# Patient Record
Sex: MALE | Race: WHITE | NOT HISPANIC OR LATINO | Employment: FULL TIME | URBAN - METROPOLITAN AREA
[De-identification: names, ages, dates, MRNs, and addresses within clinical notes are randomized per-mention and may not be internally consistent; named-entity substitution may affect disease eponyms.]

---

## 2017-01-24 ENCOUNTER — OFFICE VISIT (OUTPATIENT)
Dept: FAMILY MEDICINE CLINIC | Facility: CLINIC | Age: 52
End: 2017-01-24

## 2017-01-24 VITALS
BODY MASS INDEX: 33.33 KG/M2 | WEIGHT: 225 LBS | HEIGHT: 69 IN | DIASTOLIC BLOOD PRESSURE: 80 MMHG | SYSTOLIC BLOOD PRESSURE: 132 MMHG | HEART RATE: 69 BPM | OXYGEN SATURATION: 97 % | RESPIRATION RATE: 17 BRPM

## 2017-01-24 DIAGNOSIS — Z00.00 ANNUAL PHYSICAL EXAM: Primary | ICD-10-CM

## 2017-01-24 DIAGNOSIS — Z80.0 FAMILY HISTORY OF COLON CANCER: ICD-10-CM

## 2017-01-24 DIAGNOSIS — E03.9 ADULT HYPOTHYROIDISM: ICD-10-CM

## 2017-01-24 DIAGNOSIS — Z23 IMMUNIZATION DUE: ICD-10-CM

## 2017-01-24 PROBLEM — E78.5 HLD (HYPERLIPIDEMIA): Status: ACTIVE | Noted: 2017-01-24

## 2017-01-24 PROBLEM — E78.5 DYSLIPIDEMIA: Status: ACTIVE | Noted: 2017-01-24

## 2017-01-24 PROBLEM — E29.1 HYPOGONADISM IN MALE: Status: ACTIVE | Noted: 2017-01-24

## 2017-01-24 PROCEDURE — 99396 PREV VISIT EST AGE 40-64: CPT | Performed by: FAMILY MEDICINE

## 2017-01-24 PROCEDURE — 90714 TD VACC NO PRESV 7 YRS+ IM: CPT | Performed by: FAMILY MEDICINE

## 2017-01-24 PROCEDURE — 90471 IMMUNIZATION ADMIN: CPT | Performed by: FAMILY MEDICINE

## 2017-01-24 RX ORDER — LEVOTHYROXINE SODIUM 0.05 MG/1
TABLET ORAL
Refills: 1 | COMMUNITY
Start: 2016-12-19 | End: 2019-06-18

## 2017-01-24 NOTE — MR AVS SNAPSHOT
Tre Kenney   1/24/2017 9:00 AM   Office Visit    Dept Phone:  [phone]   Encounter #:  [id number]    Provider:  Fransisca Khan MD   Department:  [facility]                Your Full Care Plan              Today's Medication Changes          These changes are accurate as of: 1/24/17  9:55 AM.  If you have any questions, ask your nurse or doctor.               Stop taking medication(s)listed here:     amoxicillin-clavulanate 875-125 MG per tablet   Commonly known as:  AUGMENTIN                      Your Updated Medication List          This list is accurate as of: 1/24/17  9:55 AM.  Always use your most recent med list.                clomiPHENE 50 MG tablet   Commonly known as:  CLOMID       fish oil 1200 MG capsule capsule       levothyroxine 50 MCG tablet   Commonly known as:  SYNTHROID, LEVOTHROID       MENS MULTIVITAMIN PLUS tablet       NATURE-THROID 97.5 MG tablet   Generic drug:  Thyroid       Rhodiola 300 MG capsule       vitamin D3 5000 UNITS capsule capsule       Zinc 30 MG capsule               We Performed the Following     Td Vaccine Greater Than or Equal To 6yo Preservative Free IM       You Were Diagnosed With        Codes Comments    Annual physical exam    -  Primary ICD-10-CM: Z00.00  ICD-9-CM: V70.0     Immunization due     ICD-10-CM: Z23  ICD-9-CM: V05.9     Adult hypothyroidism     ICD-10-CM: E03.9  ICD-9-CM: 244.9     Family history of colon cancer     ICD-10-CM: Z80.0  ICD-9-CM: V16.0       Instructions     None    Patient Instructions History      Upcoming Appointments     Visit Type Date Time Department    PHYSICAL 1/24/2017  9:00 AM DAMIAN EBLLO      [employer] Signup     Our records indicate that you have an active [employer] account.    You can view your After Visit Summary by going to [employer] and logging in with your [employer] username and password.  If you don't have a [employer] username  "and password but a parent or guardian has access to your record, the parent or guardian should login with their own [employer] username and password and access your record to view the After Visit Summary.    If you have questions, you can email [email or web address] or call [phone] to talk to our [employer] staff.  Remember, [employer] is NOT to be used for urgent needs.  For medical emergencies, dial 911.               Other Info from Your Visit           Allergies     Sulfa Antibiotics  Rash      Reason for Visit     Annual Exam           Vital Signs     Blood Pressure Pulse Respirations Height Weight Oxygen Saturation    132/80 69 17 68.75\" (174.6 cm) 225 lb (102 kg) 97%    Body Mass Index                   33.47 kg/m2           Problems and Diagnoses Noted     Adult hypothyroidism    Dyslipidemia    Family history of colon cancer    High cholesterol or triglycerides    Hypogonadism in male    Annual physical exam    -  Primary    Immunization due            "

## 2017-01-24 NOTE — PROGRESS NOTES
"Subjective   Tre Kenney is a 51 y.o. male.     History of Present Illness He is here for a CPE. All labs from here and Dr Rodriguez in [place] are good.   Needs colonoscopy every 5 years bc of father. Done about 5 yr ago. He is not sure where it was done.   Says shoulder pain compared to his last visit of 12/15 is improved.    The following portions of the patient's history were reviewed and updated as appropriate: allergies, current medications, past social history and problem list.    Review of Systems   Constitutional: Negative for appetite change, fever and unexpected weight change.   HENT: Negative for ear pain, facial swelling and sore throat.    Eyes: Negative for pain and visual disturbance.   Respiratory: Negative for chest tightness, shortness of breath and wheezing.    Cardiovascular: Negative for chest pain and palpitations.   Gastrointestinal: Negative for abdominal pain and blood in stool.   Endocrine: Negative.    Genitourinary: Negative for difficulty urinating and hematuria.   Musculoskeletal: Negative for joint swelling.   Neurological: Negative for tremors, seizures and syncope.   Hematological: Negative for adenopathy.   Psychiatric/Behavioral: Negative.        Objective   Visit Vitals   • /80   • Pulse 69   • Resp 17   • Ht 68.75\" (174.6 cm)   • Wt 225 lb (102 kg)   • SpO2 97%   • BMI 33.47 kg/m2     Physical Exam   Constitutional: He is oriented to person, place, and time. He appears well-developed and well-nourished. No distress.   HENT:   Head: Normocephalic and atraumatic. Hair is normal.   Right Ear: Hearing, tympanic membrane, external ear and ear canal normal.   Left Ear: Hearing, tympanic membrane, external ear and ear canal normal.   Nose: No sinus tenderness or nasal deformity.   Mouth/Throat: Uvula is midline, oropharynx is clear and moist and mucous membranes are normal. No oral lesions. No uvula swelling.   Eyes: Conjunctivae, EOM and lids are normal. Pupils are equal, " round, and reactive to light. Right eye exhibits no discharge. Left eye exhibits no discharge. No scleral icterus. Right eye exhibits normal extraocular motion and no nystagmus. Left eye exhibits normal extraocular motion and no nystagmus.   Fundoscopic exam:       The right eye shows red reflex.        The left eye shows red reflex.   Neck: Normal range of motion. Neck supple. No JVD present. No tracheal deviation present. No thyromegaly present.   Cardiovascular: Normal rate, regular rhythm, normal heart sounds, intact distal pulses and normal pulses.  Exam reveals no gallop.    No murmur heard.  Pulmonary/Chest: Effort normal and breath sounds normal. No respiratory distress. He has no wheezes. He has no rales. He exhibits no tenderness.   Abdominal: Soft. Bowel sounds are normal. He exhibits no distension and no mass. There is no tenderness. There is no guarding. No hernia.   Musculoskeletal: Normal range of motion. He exhibits no edema, tenderness or deformity.   Lymphadenopathy:     He has no cervical adenopathy.   Neurological: He is alert and oriented to person, place, and time. He has normal reflexes. He displays normal reflexes. No cranial nerve deficit. He exhibits normal muscle tone. Coordination normal.   Skin: Skin is warm and dry. No rash noted. He is not diaphoretic.   Psychiatric: He has a normal mood and affect. His behavior is normal. Judgment and thought content normal.   Nursing note and vitals reviewed.      Assessment/Plan   Problem List Items Addressed This Visit        Endocrine    Adult hypothyroidism    Relevant Medications    levothyroxine (SYNTHROID, LEVOTHROID) 50 MCG tablet       Other    Family history of colon cancer      Other Visit Diagnoses     Annual physical exam    -  Primary    Immunization due        Relevant Orders    Td Vaccine Greater Than or Equal To 6yo Preservative Free IM (Completed)           He will let me know when and where he had his last colonoscopy. I don't know  which free [facility] [place] he is referring to.

## 2017-12-04 VITALS
DIASTOLIC BLOOD PRESSURE: 68 MMHG | SYSTOLIC BLOOD PRESSURE: 124 MMHG | SYSTOLIC BLOOD PRESSURE: 120 MMHG | TEMPERATURE: 98.7 F | HEART RATE: 66 BPM | SYSTOLIC BLOOD PRESSURE: 117 MMHG | OXYGEN SATURATION: 94 % | HEART RATE: 69 BPM | DIASTOLIC BLOOD PRESSURE: 72 MMHG | SYSTOLIC BLOOD PRESSURE: 152 MMHG | DIASTOLIC BLOOD PRESSURE: 69 MMHG | SYSTOLIC BLOOD PRESSURE: 121 MMHG | TEMPERATURE: 97.6 F | SYSTOLIC BLOOD PRESSURE: 133 MMHG | DIASTOLIC BLOOD PRESSURE: 76 MMHG | HEART RATE: 70 BPM | HEART RATE: 64 BPM | HEART RATE: 65 BPM | RESPIRATION RATE: 22 BRPM | RESPIRATION RATE: 10 BRPM | DIASTOLIC BLOOD PRESSURE: 95 MMHG | RESPIRATION RATE: 12 BRPM | DIASTOLIC BLOOD PRESSURE: 85 MMHG | RESPIRATION RATE: 18 BRPM | OXYGEN SATURATION: 97 % | HEART RATE: 62 BPM | DIASTOLIC BLOOD PRESSURE: 71 MMHG | RESPIRATION RATE: 16 BRPM | RESPIRATION RATE: 14 BRPM | OXYGEN SATURATION: 91 % | SYSTOLIC BLOOD PRESSURE: 131 MMHG | DIASTOLIC BLOOD PRESSURE: 83 MMHG | DIASTOLIC BLOOD PRESSURE: 51 MMHG | OXYGEN SATURATION: 98 % | HEART RATE: 60 BPM | OXYGEN SATURATION: 95 % | HEIGHT: 69 IN | HEART RATE: 59 BPM | SYSTOLIC BLOOD PRESSURE: 118 MMHG | SYSTOLIC BLOOD PRESSURE: 143 MMHG | DIASTOLIC BLOOD PRESSURE: 60 MMHG | SYSTOLIC BLOOD PRESSURE: 95 MMHG | OXYGEN SATURATION: 96 % | RESPIRATION RATE: 15 BRPM | WEIGHT: 217 LBS | HEART RATE: 61 BPM | SYSTOLIC BLOOD PRESSURE: 154 MMHG

## 2017-12-05 ENCOUNTER — AMBULATORY SURGICAL CENTER (AMBULATORY)
Dept: URBAN - METROPOLITAN AREA SURGERY 17 | Facility: SURGERY | Age: 52
End: 2017-12-05
Payer: COMMERCIAL

## 2017-12-05 ENCOUNTER — OFFICE (AMBULATORY)
Dept: URBAN - METROPOLITAN AREA CLINIC 64 | Facility: CLINIC | Age: 52
End: 2017-12-05
Payer: COMMERCIAL

## 2017-12-05 DIAGNOSIS — K64.8 OTHER HEMORRHOIDS: ICD-10-CM

## 2017-12-05 DIAGNOSIS — Z80.0 FAMILY HISTORY OF MALIGNANT NEOPLASM OF DIGESTIVE ORGANS: ICD-10-CM

## 2017-12-05 DIAGNOSIS — Z86.010 PERSONAL HISTORY OF COLONIC POLYPS: ICD-10-CM

## 2017-12-05 DIAGNOSIS — D12.4 BENIGN NEOPLASM OF DESCENDING COLON: ICD-10-CM

## 2017-12-05 PROBLEM — Z12.11 SURVEILLANCE DUE TO PRIOR COLONIC NEOPLASIA: Status: ACTIVE | Noted: 2017-12-05

## 2017-12-05 LAB
GI HISTOLOGY: A. SELECT: (no result)
GI HISTOLOGY: PDF REPORT: (no result)

## 2017-12-05 PROCEDURE — 45385 COLONOSCOPY W/LESION REMOVAL: CPT | Mod: 33 | Performed by: INTERNAL MEDICINE

## 2017-12-05 PROCEDURE — 88305 TISSUE EXAM BY PATHOLOGIST: CPT | Performed by: INTERNAL MEDICINE

## 2017-12-05 RX ADMIN — PROPOFOL 25 MG: 10 INJECTION, EMULSION INTRAVENOUS at 10:45

## 2017-12-05 RX ADMIN — PROPOFOL 50 MG: 10 INJECTION, EMULSION INTRAVENOUS at 10:43

## 2017-12-05 RX ADMIN — PROPOFOL 25 MG: 10 INJECTION, EMULSION INTRAVENOUS at 10:56

## 2017-12-05 RX ADMIN — PROPOFOL 25 MG: 10 INJECTION, EMULSION INTRAVENOUS at 10:50

## 2017-12-05 RX ADMIN — PROPOFOL 25 MG: 10 INJECTION, EMULSION INTRAVENOUS at 10:53

## 2017-12-05 RX ADMIN — PROPOFOL 100 MG: 10 INJECTION, EMULSION INTRAVENOUS at 10:33

## 2017-12-05 RX ADMIN — PROPOFOL 50 MG: 10 INJECTION, EMULSION INTRAVENOUS at 10:37

## 2017-12-05 RX ADMIN — PROPOFOL 25 MG: 10 INJECTION, EMULSION INTRAVENOUS at 10:47

## 2017-12-05 RX ADMIN — PROPOFOL 50 MG: 10 INJECTION, EMULSION INTRAVENOUS at 10:41

## 2017-12-05 RX ADMIN — LIDOCAINE HYDROCHLORIDE 50 MG: 10 INJECTION, SOLUTION EPIDURAL; INFILTRATION; INTRACAUDAL; PERINEURAL at 10:33

## 2017-12-05 RX ADMIN — PROPOFOL 50 MG: 10 INJECTION, EMULSION INTRAVENOUS at 10:35

## 2017-12-05 RX ADMIN — PROPOFOL 50 MG: 10 INJECTION, EMULSION INTRAVENOUS at 10:39

## 2019-06-18 ENCOUNTER — OFFICE VISIT (OUTPATIENT)
Dept: INTERNAL MEDICINE | Facility: CLINIC | Age: 54
End: 2019-06-18

## 2019-06-18 VITALS
HEIGHT: 69 IN | HEART RATE: 66 BPM | SYSTOLIC BLOOD PRESSURE: 134 MMHG | OXYGEN SATURATION: 97 % | WEIGHT: 217 LBS | DIASTOLIC BLOOD PRESSURE: 82 MMHG | BODY MASS INDEX: 32.14 KG/M2

## 2019-06-18 DIAGNOSIS — Z00.00 PHYSICAL EXAM: Primary | ICD-10-CM

## 2019-06-18 DIAGNOSIS — Z12.11 SCREENING FOR COLON CANCER: ICD-10-CM

## 2019-06-18 DIAGNOSIS — Z12.5 SCREENING FOR PROSTATE CANCER: ICD-10-CM

## 2019-06-18 DIAGNOSIS — E78.5 HYPERLIPIDEMIA, UNSPECIFIED HYPERLIPIDEMIA TYPE: ICD-10-CM

## 2019-06-18 DIAGNOSIS — E03.9 ADULT HYPOTHYROIDISM: ICD-10-CM

## 2019-06-18 DIAGNOSIS — L71.9 ROSACEA: ICD-10-CM

## 2019-06-18 DIAGNOSIS — R79.89 LOW TESTOSTERONE LEVEL IN MALE: ICD-10-CM

## 2019-06-18 PROBLEM — E29.1 HYPOGONADISM IN MALE: Status: RESOLVED | Noted: 2017-01-24 | Resolved: 2019-06-18

## 2019-06-18 LAB — HEMOCCULT STL QL IA: NEGATIVE

## 2019-06-18 PROCEDURE — 93000 ELECTROCARDIOGRAM COMPLETE: CPT | Performed by: NURSE PRACTITIONER

## 2019-06-18 PROCEDURE — 82274 ASSAY TEST FOR BLOOD FECAL: CPT | Performed by: NURSE PRACTITIONER

## 2019-06-18 PROCEDURE — 99396 PREV VISIT EST AGE 40-64: CPT | Performed by: NURSE PRACTITIONER

## 2019-06-18 RX ORDER — LEVOTHYROXINE SODIUM 0.05 MG/1
TABLET ORAL DAILY
COMMUNITY
End: 2021-03-23

## 2019-06-18 RX ORDER — AMOXICILLIN 500 MG
CAPSULE ORAL
COMMUNITY

## 2019-06-18 RX ORDER — LANOLIN ALCOHOL/MO/W.PET/CERES
1 CREAM (GRAM) TOPICAL
COMMUNITY
End: 2021-03-23

## 2019-06-18 RX ORDER — PYRIDOXINE HCL (VITAMIN B6) 100 MG
TABLET ORAL DAILY
COMMUNITY

## 2019-06-18 RX ORDER — LIOTHYRONINE SODIUM 100 %
POWDER (GRAM) MISCELLANEOUS
COMMUNITY
End: 2021-03-23

## 2019-06-18 RX ORDER — METRONIDAZOLE 10 MG/G
1 GEL TOPICAL DAILY
COMMUNITY
End: 2022-05-25

## 2019-06-18 NOTE — PROGRESS NOTES
Subjective   Tre Kenney is a 54 y.o. male who presents to establish care and for a physical exam.    He is a previous patient of Dr. Maxwell, presents today for a physical exam.         The following portions of the patient's history were reviewed and updated as appropriate: allergies, current medications, past social history and problem list.    No past medical history on file.      Current Outpatient Medications:   •  Bioflavonoid Products (VITAMIN C ER) 1000-100 MG tablet controlled-release, Daily., Disp: , Rfl:   •  Cholecalciferol (VITAMIN D3) 5000 UNITS capsule capsule, Take 5,000 Units by mouth daily., Disp: , Rfl:   •  levothyroxine (LEVOXYL) 50 MCG tablet, Daily., Disp: , Rfl:   •  Liothyronine powder, Compounded to make extended release take 86 mcg in the morning, Disp: , Rfl:   •  melatonin 3 MG tablet, Take 1 tablet by mouth every night at bedtime., Disp: , Rfl:   •  metroNIDAZOLE (METROGEL) 1 % gel, Apply 1 application topically to the appropriate area as directed Daily. For rosacea, Disp: , Rfl:   •  Omega-3 Fatty Acids (FISH OIL) 1200 MG capsule capsule, , Disp: , Rfl:   •  Unable to find, Daily. Eder Al's Formula (multivitamin), Disp: , Rfl:   •  Unable to find, Daily. Eder Methyl Guard Plus, 2 in the morning, 2 in the evening, Disp: , Rfl:   •  Unable to find, Eder Choleast 600 mg red yeast rice+CoQ 10-3 in a.m., 3 in p.m., Disp: , Rfl:   •  Unable to find, Kyolic Reserve-aged garlic extract, 2 in am, 2 in pm, Disp: , Rfl:   •  Unable to find, Daily. Kyodophilus 9, Disp: , Rfl:     Allergies   Allergen Reactions   • Sulfa Antibiotics Rash       Review of Systems   Constitutional: Negative for activity change, appetite change, chills, diaphoresis, fatigue, fever and unexpected weight change.   HENT: Negative for congestion, dental problem, drooling, ear discharge, ear pain, facial swelling, hearing loss, mouth sores, nosebleeds, postnasal drip, rhinorrhea, sinus pressure, sore throat,  "tinnitus and trouble swallowing.    Eyes: Positive for visual disturbance (wears glasses, due for eye exam). Negative for photophobia, pain, discharge, redness and itching.   Respiratory: Negative for apnea, cough, choking, chest tightness, shortness of breath and wheezing.    Cardiovascular: Negative for chest pain, palpitations and leg swelling.        No orthopnea, PND, RANKIN   Gastrointestinal: Negative for abdominal pain, blood in stool, constipation, diarrhea, nausea and vomiting.   Endocrine: Negative for cold intolerance, heat intolerance, polydipsia and polyuria.   Genitourinary: Negative for decreased urine volume, dysuria, enuresis, flank pain, frequency, hematuria and urgency.   Musculoskeletal: Negative for arthralgias, back pain, gait problem, joint swelling, myalgias, neck pain and neck stiffness.   Skin: Negative for color change and rash.        No hair changes, no nail changes   Allergic/Immunologic: Negative for environmental allergies, food allergies and immunocompromised state.   Neurological: Positive for headaches (occasionally, worse with fatigue). Negative for dizziness, tremors, seizures, syncope, speech difficulty, weakness, light-headedness and numbness.   Hematological: Negative for adenopathy. Does not bruise/bleed easily.   Psychiatric/Behavioral: Negative for agitation, confusion, decreased concentration, dysphoric mood, sleep disturbance and suicidal ideas. The patient is not nervous/anxious.        Objective   Vitals:    06/18/19 0809   BP: 134/82   BP Location: Left arm   Patient Position: Sitting   Cuff Size: Adult   Pulse: 66   SpO2: 97%   Weight: 98.4 kg (217 lb)   Height: 175.3 cm (69\")     Physical Exam   Constitutional: He is oriented to person, place, and time. He appears well-developed and well-nourished. No distress.   HENT:   Head: Normocephalic and atraumatic.   Right Ear: External ear normal.   Left Ear: External ear normal.   Nose: Nose normal.   Mouth/Throat: Oropharynx " is clear and moist.   Eyes: Conjunctivae and EOM are normal. Pupils are equal, round, and reactive to light. Right eye exhibits no discharge. Left eye exhibits no discharge. No scleral icterus.   Neck: Normal range of motion. Neck supple. No JVD present. No tracheal deviation present. No thyromegaly present.   Cardiovascular: Normal rate, regular rhythm, normal heart sounds and intact distal pulses. Exam reveals no gallop and no friction rub.   No murmur heard.  Pulmonary/Chest: Effort normal and breath sounds normal. No respiratory distress. He has no wheezes. He has no rales. He exhibits no tenderness.   Abdominal: Soft. Bowel sounds are normal. He exhibits no distension and no mass. There is no tenderness. There is no rebound and no guarding. No hernia.   Genitourinary: Rectum normal, prostate normal and penis normal. Rectal exam shows guaiac negative stool.   Musculoskeletal: Normal range of motion. He exhibits no edema.   Lymphadenopathy:     He has no cervical adenopathy.   Neurological: He is alert and oriented to person, place, and time. He has normal reflexes. No cranial nerve deficit. He exhibits normal muscle tone. Coordination normal.   Skin: Skin is warm and dry. No rash noted. No erythema.   Psychiatric: He has a normal mood and affect. His behavior is normal. Judgment and thought content normal.   Vitals reviewed.      Assessment/Plan   Tre was seen today for annual exam.    Diagnoses and all orders for this visit:    Physical exam    Adult hypothyroidism    Hyperlipidemia, unspecified hyperlipidemia type    Low testosterone level in male    Rosacea    Screening for colon cancer  -     POC FECAL OCCULT BLOOD BY IMMUNOASSAY    Other orders  -     ECG 12 Lead      ECG 12 Lead  Date/Time: 6/18/2019 8:54 AM  Performed by: Hortensia Arroyo LPN  Authorized by: Pati Bashir APRN   Comparison: not compared with previous ECG   Previous ECG: no previous ECG available  Rhythm: sinus rhythm  Rate:  normal  BPM: 64  Conduction: conduction normal  ST Segments: ST segments normal  T Waves: T waves normal  QRS axis: normal  Other: no other findings    Clinical impression: normal ECG        Risk assessment:  He has a hx of hypothyroidism and low testosterone level, currently managed by integrative medicine physician in [place], [place]. He has labs done every 6 months (last done 1/2019), scheduled for labs in July. He will send a copy of those labs to me for review.  He also has a family hx (dad) of colon cancer, last colonoscopy 2017 by Dr. Shar Kenney.  He exercises regularly (does spinning class on Mondays), BMI is elevated at 32.1.  He was recently diagnosed with rosacea and started on MetroGel by his dermatologist.    Prevention:  His next colonoscopy is due in 2012.  Discussed Shingrix vaccine, he will check with pharmacy regarding coverage and availability.  His Tdap was administered in 2017, he has declined annual flu vaccines.  He receives regular eye exams.

## 2019-06-19 PROBLEM — L71.9 ROSACEA: Status: ACTIVE | Noted: 2019-06-19

## 2019-06-24 ENCOUNTER — TELEPHONE (OUTPATIENT)
Dept: INTERNAL MEDICINE | Facility: CLINIC | Age: 54
End: 2019-06-24

## 2019-06-24 NOTE — TELEPHONE ENCOUNTER
----- Message from KARON Thomson sent at 6/23/2019  7:58 AM EDT -----  Regarding: Lab orders  Pt would like printed order for labs (from 6/18) mailed to him-he will be having labs done at [facility] in July. Thanks.

## 2019-06-28 ENCOUNTER — RESULTS ENCOUNTER (OUTPATIENT)
Dept: INTERNAL MEDICINE | Facility: CLINIC | Age: 54
End: 2019-06-28

## 2019-06-28 DIAGNOSIS — Z12.5 SCREENING FOR PROSTATE CANCER: ICD-10-CM

## 2019-06-28 DIAGNOSIS — Z00.00 PHYSICAL EXAM: ICD-10-CM

## 2021-03-23 ENCOUNTER — OFFICE VISIT (OUTPATIENT)
Dept: INTERNAL MEDICINE | Facility: CLINIC | Age: 56
End: 2021-03-23

## 2021-03-23 VITALS
HEART RATE: 69 BPM | SYSTOLIC BLOOD PRESSURE: 122 MMHG | BODY MASS INDEX: 35.22 KG/M2 | HEIGHT: 69 IN | DIASTOLIC BLOOD PRESSURE: 80 MMHG | OXYGEN SATURATION: 99 % | WEIGHT: 237.8 LBS | RESPIRATION RATE: 16 BRPM | TEMPERATURE: 97.7 F

## 2021-03-23 DIAGNOSIS — Z00.00 PHYSICAL EXAM: Primary | ICD-10-CM

## 2021-03-23 DIAGNOSIS — L71.9 ROSACEA: ICD-10-CM

## 2021-03-23 DIAGNOSIS — Z12.11 SCREENING FOR COLON CANCER: ICD-10-CM

## 2021-03-23 DIAGNOSIS — E03.9 ADULT HYPOTHYROIDISM: Chronic | ICD-10-CM

## 2021-03-23 DIAGNOSIS — E78.00 HYPERCHOLESTEREMIA: Chronic | ICD-10-CM

## 2021-03-23 DIAGNOSIS — Z11.59 SCREENING FOR VIRAL DISEASE: ICD-10-CM

## 2021-03-23 DIAGNOSIS — R79.89 LOW TESTOSTERONE LEVEL IN MALE: Chronic | ICD-10-CM

## 2021-03-23 LAB — HEMOCCULT STL QL IA: NEGATIVE

## 2021-03-23 PROCEDURE — 99396 PREV VISIT EST AGE 40-64: CPT | Performed by: NURSE PRACTITIONER

## 2021-03-23 PROCEDURE — 82274 ASSAY TEST FOR BLOOD FECAL: CPT | Performed by: NURSE PRACTITIONER

## 2021-03-23 RX ORDER — MULTIPLE VITAMINS W/ MINERALS TAB 9MG-400MCG
TAB ORAL
COMMUNITY

## 2021-03-23 RX ORDER — UBIDECARENONE 50 MG
CAPSULE ORAL
Qty: 1 EACH | Refills: 5
Start: 2021-03-23

## 2021-03-23 RX ORDER — LEVOTHYROXINE SODIUM 0.03 MG/1
25 TABLET ORAL DAILY
COMMUNITY

## 2021-03-23 NOTE — PROGRESS NOTES
Subjective   Tre Kenney is a 56 y.o. male who presents for a physical exam.    History of Present Illness     The following portions of the patient's history were reviewed and updated as appropriate: allergies, current medications, past social history and problem list.    History reviewed. No pertinent past medical history.      Current Outpatient Medications:   •  Barberry-Oreg Grape-Goldenseal (Berberine Complex) 200-200-50 MG capsule, , Disp: , Rfl:   •  Bioflavonoid Products (VITAMIN C ER) 1000-100 MG tablet controlled-release, Daily., Disp: , Rfl:   •  Cholecalciferol (VITAMIN D3) 5000 UNITS capsule capsule, Take 5,000 Units by mouth daily., Disp: , Rfl:   •  clomiPHENE (CLOMID) 50 MG tablet, TAKE 1 TABLET BY MOUTH 3 TIMES PER WEEK, Disp: , Rfl:   •  levothyroxine (SYNTHROID, LEVOTHROID) 25 MCG tablet, Take 25 mcg by mouth Daily. Once a Day, Disp: , Rfl:   •  metroNIDAZOLE (METROGEL) 1 % gel, Apply 1 application topically to the appropriate area as directed Daily. For rosacea, Disp: , Rfl:   •  multivitamin with minerals (Multivitamin Adults 50+) tablet tablet, , Disp: , Rfl:   •  Omega-3 Fatty Acids (FISH OIL) 1200 MG capsule capsule, , Disp: , Rfl:   •  Unable to find, Daily. Eder Methyl Guard Plus, 2 in the morning, 2 in the evening, Disp: , Rfl:   •  Unable to find, Eder Choleast 600 mg red yeast rice+CoQ 10-3 in a.m., 3 in p.m., Disp: , Rfl:   •  Unable to find, Kyolic Reserve-aged garlic extract, 2 in am, 2 in pm, Disp: , Rfl:   •  Unable to find, Daily. Kyodophilus 9, Disp: , Rfl:   •  Red Yeast Rice 600 MG tablet, 2 tablets twice a day (900mg), Disp: 1 each, Rfl: 5    Allergies   Allergen Reactions   • Sulfa Antibiotics Rash       Review of Systems   Constitutional: Negative for activity change, appetite change, chills, diaphoresis, fatigue, fever and unexpected weight change.        Reports 10# weight gain over past several months, has not done cycling class due to pandemic   HENT: Positive for  tinnitus (chronic, no changes). Negative for congestion, dental problem, drooling, ear discharge, ear pain, facial swelling, hearing loss, mouth sores, nosebleeds, postnasal drip, rhinorrhea, sinus pressure, sore throat and trouble swallowing.    Eyes: Positive for visual disturbance (wears glasses, no vision changes (eye exam Fall 2020)). Negative for photophobia, pain, discharge, redness and itching.   Respiratory: Negative for apnea, cough, choking, chest tightness, shortness of breath and wheezing.    Cardiovascular: Negative for chest pain, palpitations and leg swelling.        No orthopnea, PND, RANKIN   Gastrointestinal: Negative for abdominal pain, blood in stool, constipation, diarrhea, nausea and vomiting.   Endocrine: Positive for cold intolerance (improving). Negative for heat intolerance, polydipsia and polyuria.        Low Testosterone   Genitourinary: Negative for decreased urine volume, dysuria, enuresis, flank pain, frequency, hematuria and urgency.   Musculoskeletal: Positive for arthralgias (intermittent joint stiffness). Negative for back pain, gait problem, joint swelling, myalgias, neck pain and neck stiffness.   Skin: Negative for color change and rash.        Hx rosacea, managed by derm (MetroGel)  No hair changes, no nail changes     Allergic/Immunologic: Negative for environmental allergies, food allergies and immunocompromised state.   Neurological: Positive for headaches (triggered by dust (replacing floors in house)). Negative for dizziness, tremors, seizures, syncope, speech difficulty, weakness, light-headedness and numbness.   Hematological: Negative for adenopathy. Does not bruise/bleed easily.   Psychiatric/Behavioral: Negative for agitation, confusion, decreased concentration, dysphoric mood, sleep disturbance and suicidal ideas. The patient is not nervous/anxious.        Objective   Vitals:    03/23/21 1255   BP: 122/80   BP Location: Left arm   Patient Position: Sitting   Cuff Size:  "Adult   Pulse: 69   Resp: 16   Temp: 97.7 °F (36.5 °C)   TempSrc: Temporal   SpO2: 99%   Weight: 108 kg (237 lb 12.8 oz)   Height: 175.3 cm (69\")     Body mass index is 35.12 kg/m².  Physical Exam  Vitals reviewed.   Constitutional:       General: He is not in acute distress.     Appearance: He is well-developed.   HENT:      Head: Normocephalic and atraumatic.      Right Ear: External ear normal.      Left Ear: External ear normal.      Nose: Nose normal.   Eyes:      General: No scleral icterus.        Right eye: No discharge.         Left eye: No discharge.      Conjunctiva/sclera: Conjunctivae normal.      Pupils: Pupils are equal, round, and reactive to light.   Neck:      Thyroid: No thyromegaly.      Vascular: No JVD.      Trachea: No tracheal deviation.   Cardiovascular:      Rate and Rhythm: Normal rate and regular rhythm.      Heart sounds: Normal heart sounds. No murmur heard.   No friction rub. No gallop.    Pulmonary:      Effort: Pulmonary effort is normal. No respiratory distress.      Breath sounds: Normal breath sounds. No wheezing or rales.   Chest:      Chest wall: No tenderness.   Abdominal:      General: Bowel sounds are normal. There is no distension.      Palpations: Abdomen is soft. There is no mass.      Tenderness: There is no abdominal tenderness. There is no guarding or rebound.      Hernia: No hernia is present.   Genitourinary:     Penis: Normal.       Prostate: Normal.      Rectum: Normal. Guaiac result negative.   Musculoskeletal:         General: Normal range of motion.      Cervical back: Normal range of motion and neck supple.   Lymphadenopathy:      Cervical: No cervical adenopathy.   Skin:     General: Skin is warm and dry.      Findings: No erythema or rash.   Neurological:      Mental Status: He is alert and oriented to person, place, and time.      Cranial Nerves: No cranial nerve deficit.      Motor: No abnormal muscle tone.      Coordination: Coordination normal.      Deep " Tendon Reflexes: Reflexes are normal and symmetric.   Psychiatric:         Behavior: Behavior normal.         Thought Content: Thought content normal.         Judgment: Judgment normal.         Assessment/Plan   Diagnoses and all orders for this visit:    1. Physical exam (Primary)  -     CBC & Differential  -     Lipid Panel    2. Hypercholesteremia  Assessment & Plan:  He takes OTC Red Yeast Rice for mgmt    Orders:  -     Red Yeast Rice 600 MG tablet; 2 tablets twice a day (900mg)  Dispense: 1 each; Refill: 5    3. Low testosterone level in male  Assessment & Plan:  Managed by Integrative medicine physician in [place], [place]  Dr. Rodriguez      4. Adult hypothyroidism  Assessment & Plan:  Managed by Integrative medicine physician in [place], [place]  Managed on Synthroid, TSH checked regularly      5. Rosacea  Assessment & Plan:  Stable, not currently managed on any medication      6. Screening for colon cancer  -     POC FECAL OCCULT BLOOD BY IMMUNOASSAY    7. Screening for viral disease  -     Hepatitis C Antibody  Risk assessment:  He has a family hx (father) of lung cancer and colon cancer, he receives a screening colonoscopy q5 years.  He is followed by Dr. Rodriguez in [place], [place] for low testosterone and is currently managed on Clomid. He has brought recent labs for review.  His BMI is 35.12-discussed diet and exercise program with the goal of weight loss.    Prevention:  He has declined an annual flu vaccine. Tdap is current. He has started the COVID-19 series.  Discussed Shingrix vaccine, he will check with pharmacy regarding coverage and availability.  Labs are ordered for his next trip to [facility].    Form completed for [employer].    Discussed healthy lifestyle choices such as maintaining a balanced diet low in carbohydrates and limiting caffeine and alcohol intake.  Recommended routine exercise for bone strength and cardiovascular health.

## 2021-03-30 PROBLEM — R79.89 LOW TESTOSTERONE LEVEL IN MALE: Chronic | Status: ACTIVE | Noted: 2019-06-18

## 2021-03-30 PROBLEM — L71.9 ROSACEA: Chronic | Status: ACTIVE | Noted: 2019-06-19

## 2021-03-30 PROBLEM — E78.00 HYPERCHOLESTEREMIA: Chronic | Status: ACTIVE | Noted: 2017-01-24

## 2021-03-30 PROBLEM — E78.00 HYPERCHOLESTEREMIA: Status: ACTIVE | Noted: 2017-01-24

## 2021-03-30 PROBLEM — E03.9 ADULT HYPOTHYROIDISM: Chronic | Status: ACTIVE | Noted: 2017-01-24

## 2021-03-30 NOTE — ASSESSMENT & PLAN NOTE
Managed by Integrative medicine physician in [place], [place]  Managed on Synthroid, TSH checked regularly

## 2021-04-05 LAB
BASOPHILS # BLD AUTO: 0 X10E3/UL (ref 0–0.2)
BASOPHILS NFR BLD AUTO: 1 %
CHOLEST SERPL-MCNC: 139 MG/DL (ref 100–199)
EOSINOPHIL # BLD AUTO: 0.1 X10E3/UL (ref 0–0.4)
EOSINOPHIL NFR BLD AUTO: 2 %
ERYTHROCYTE [DISTWIDTH] IN BLOOD BY AUTOMATED COUNT: 13.1 % (ref 11.6–15.4)
HCT VFR BLD AUTO: 47.3 % (ref 37.5–51)
HCV AB S/CO SERPL IA: <0.1 S/CO RATIO (ref 0–0.9)
HDLC SERPL-MCNC: 34 MG/DL
HGB BLD-MCNC: 15.7 G/DL (ref 13–17.7)
IMM GRANULOCYTES # BLD AUTO: 0 X10E3/UL (ref 0–0.1)
IMM GRANULOCYTES NFR BLD AUTO: 0 %
LDLC SERPL CALC-MCNC: 86 MG/DL (ref 0–99)
LYMPHOCYTES # BLD AUTO: 2.3 X10E3/UL (ref 0.7–3.1)
LYMPHOCYTES NFR BLD AUTO: 39 %
MCH RBC QN AUTO: 28.1 PG (ref 26.6–33)
MCHC RBC AUTO-ENTMCNC: 33.2 G/DL (ref 31.5–35.7)
MCV RBC AUTO: 85 FL (ref 79–97)
MONOCYTES # BLD AUTO: 0.6 X10E3/UL (ref 0.1–0.9)
MONOCYTES NFR BLD AUTO: 10 %
NEUTROPHILS # BLD AUTO: 2.9 X10E3/UL (ref 1.4–7)
NEUTROPHILS NFR BLD AUTO: 48 %
PLATELET # BLD AUTO: 196 X10E3/UL (ref 150–450)
RBC # BLD AUTO: 5.58 X10E6/UL (ref 4.14–5.8)
TRIGL SERPL-MCNC: 100 MG/DL (ref 0–149)
VLDLC SERPL CALC-MCNC: 19 MG/DL (ref 5–40)
WBC # BLD AUTO: 5.9 X10E3/UL (ref 3.4–10.8)

## 2022-05-25 ENCOUNTER — OFFICE VISIT (OUTPATIENT)
Dept: INTERNAL MEDICINE | Facility: CLINIC | Age: 57
End: 2022-05-25

## 2022-05-25 VITALS
SYSTOLIC BLOOD PRESSURE: 130 MMHG | DIASTOLIC BLOOD PRESSURE: 90 MMHG | HEIGHT: 69 IN | TEMPERATURE: 97.7 F | OXYGEN SATURATION: 95 % | WEIGHT: 233.6 LBS | BODY MASS INDEX: 34.6 KG/M2 | HEART RATE: 65 BPM

## 2022-05-25 DIAGNOSIS — Z00.00 PHYSICAL EXAM: Primary | ICD-10-CM

## 2022-05-25 DIAGNOSIS — R79.89 LOW TESTOSTERONE LEVEL IN MALE: Chronic | ICD-10-CM

## 2022-05-25 DIAGNOSIS — E78.00 HYPERCHOLESTEREMIA: Chronic | ICD-10-CM

## 2022-05-25 DIAGNOSIS — E03.9 ADULT HYPOTHYROIDISM: Chronic | ICD-10-CM

## 2022-05-25 DIAGNOSIS — Z12.5 SCREENING FOR PROSTATE CANCER: ICD-10-CM

## 2022-05-25 DIAGNOSIS — R03.0 ELEVATED BLOOD PRESSURE READING: ICD-10-CM

## 2022-05-25 DIAGNOSIS — Z12.11 SCREENING FOR COLON CANCER: ICD-10-CM

## 2022-05-25 LAB — HEMOCCULT STL QL IA: NEGATIVE

## 2022-05-25 PROCEDURE — 99396 PREV VISIT EST AGE 40-64: CPT | Performed by: NURSE PRACTITIONER

## 2022-05-25 PROCEDURE — 82274 ASSAY TEST FOR BLOOD FECAL: CPT | Performed by: NURSE PRACTITIONER

## 2022-05-25 RX ORDER — GRAPE SEED EXT/BIOFLAV,CITRUS 50MG-250MG
CAPSULE ORAL
COMMUNITY

## 2022-05-25 RX ORDER — IVERMECTIN 10 MG/G
CREAM TOPICAL
COMMUNITY
Start: 2021-08-27

## 2022-05-25 RX ORDER — LIOTHYRONINE SODIUM 100 %
POWDER (GRAM) MISCELLANEOUS
COMMUNITY

## 2022-05-25 NOTE — PROGRESS NOTES
Subjective   Tre Kenney is a 57 y.o. male who is here for a physical exam.    Reports feeling well, receives treatment from Dr. Rodriguez in [place], [place] ([facility]). He is managed on Clomid for ED which he tolerates well.  He has brought recent labs which show a mildly elevated LDL of 1376 with an elevated small LDL.  He is currently managed on red yeast rice which he tolerates well.  He also tries to follow a low-fat, low-cholesterol diet.       The following portions of the patient's history were reviewed and updated as appropriate: allergies, current medications, past social history and problem list.    Past Medical History:   Diagnosis Date   • Allergic 1973    Sulfa drugs   • Colon polyp Dec. 2017    May have been some in previous colonoscopies   • Headache     Occasional   • History of medical problems 2008    Obstructive Sleep Apnea   • HL (hearing loss)     Tinnitus   • Hyperlipidemia    • Hypothyroidism    • Obesity most of life         Current Outpatient Medications:   •  Alpha-Lipoic Acid 100 MG capsule, Take 50 mg by mouth., Disp: , Rfl:   •  Barberry-Oreg Grape-Goldenseal (Berberine Complex) 200-200-50 MG capsule, , Disp: , Rfl:   •  Bioflavonoid Products (VITAMIN C ER) 1000-100 MG tablet controlled-release, Daily., Disp: , Rfl:   •  Cholecalciferol (VITAMIN D3) 5000 UNITS capsule capsule, Take 5,000 Units by mouth daily., Disp: , Rfl:   •  clomiPHENE (CLOMID) 50 MG tablet, TAKE 1 TABLET BY MOUTH 3 TIMES PER WEEK, Disp: , Rfl:   •  Grape Seed Extract 50 MG capsule, Take  by mouth., Disp: , Rfl:   •  INOSITOL-FOLIC ACID PO, Take  by mouth., Disp: , Rfl:   •  Ivermectin 1 % cream, , Disp: , Rfl:   •  levothyroxine (SYNTHROID, LEVOTHROID) 25 MCG tablet, Take 25 mcg by mouth Daily. Once a Day, Disp: , Rfl:   •  Liothyronine powder, , Disp: , Rfl:   •  Magnesium 100 MG capsule, Take  by mouth. 180mg, Disp: , Rfl:   •  multivitamin with minerals tablet tablet, , Disp: , Rfl:   •   Omega-3 Fatty Acids (FISH OIL) 1200 MG capsule capsule, , Disp: , Rfl:   •  Red Yeast Rice 600 MG tablet, 2 tablets twice a day (900mg), Disp: 1 each, Rfl: 5  •  Unable to find, Daily. Eder Methyl Guard Plus, 2 in the morning, 2 in the evening, Disp: , Rfl:   •  Unable to find, Eder Choleast 600 mg red yeast rice+CoQ 10-3 in a.m., 3 in p.m., Disp: , Rfl:   •  Unable to find, Kyolic Reserve-aged garlic extract, 2 in am, 2 in pm, Disp: , Rfl:   •  Unable to find, Jaden Labs Brain Calm, Disp: , Rfl:     Allergies   Allergen Reactions   • Sulfa Antibiotics Rash       Review of Systems   Constitutional: Negative for activity change, appetite change, chills, diaphoresis, fatigue, fever and unexpected weight change.   HENT: Positive for congestion, ear pain (left ear stopped up) and postnasal drip. Negative for dental problem, drooling, ear discharge, facial swelling, hearing loss, mouth sores, nosebleeds, rhinorrhea, sinus pressure, sore throat, tinnitus and trouble swallowing.    Eyes: Negative for photophobia, pain, discharge, redness, itching and visual disturbance.   Respiratory: Negative for apnea, cough, choking, chest tightness, shortness of breath and wheezing.    Cardiovascular: Negative for chest pain, palpitations and leg swelling.        No orthopnea, PND, RANKIN   Gastrointestinal: Negative for abdominal pain, blood in stool, constipation, diarrhea, nausea and vomiting.   Endocrine: Negative for cold intolerance, heat intolerance, polydipsia and polyuria.   Genitourinary: Negative for decreased urine volume, dysuria, enuresis, flank pain, frequency, hematuria and urgency.   Musculoskeletal: Negative for arthralgias, back pain, gait problem, joint swelling, myalgias, neck pain and neck stiffness.   Skin: Negative for color change and rash.        No hair changes, no nail changes   Allergic/Immunologic: Negative for environmental allergies, food allergies and immunocompromised state.   Neurological: Negative  "for dizziness, tremors, seizures, syncope, speech difficulty, weakness, light-headedness, numbness and headaches.   Hematological: Negative for adenopathy. Does not bruise/bleed easily.   Psychiatric/Behavioral: Negative for agitation, confusion, decreased concentration, dysphoric mood, sleep disturbance and suicidal ideas. The patient is not nervous/anxious.        Objective   Vitals:    05/25/22 0800   BP: 130/90   BP Location: Left arm   Patient Position: Sitting   Cuff Size: Adult   Pulse: 65   Temp: 97.7 °F (36.5 °C)   TempSrc: Temporal   SpO2: 95%   Weight: 106 kg (233 lb 9.6 oz)   Height: 175.3 cm (69\")     Physical Exam  Vitals reviewed.   Constitutional:       General: He is not in acute distress.     Appearance: He is well-developed.   HENT:      Head: Normocephalic and atraumatic.      Right Ear: External ear normal.      Left Ear: External ear normal.      Nose: Nose normal.   Eyes:      General: No scleral icterus.        Right eye: No discharge.         Left eye: No discharge.      Conjunctiva/sclera: Conjunctivae normal.      Pupils: Pupils are equal, round, and reactive to light.   Neck:      Thyroid: No thyromegaly.      Vascular: No JVD.      Trachea: No tracheal deviation.   Cardiovascular:      Rate and Rhythm: Normal rate and regular rhythm.      Heart sounds: Normal heart sounds. No murmur heard.    No friction rub. No gallop.   Pulmonary:      Effort: Pulmonary effort is normal. No respiratory distress.      Breath sounds: Normal breath sounds. No wheezing or rales.   Chest:      Chest wall: No tenderness.   Abdominal:      General: Bowel sounds are normal. There is no distension.      Palpations: Abdomen is soft. There is no mass.      Tenderness: There is no abdominal tenderness. There is no guarding or rebound.      Hernia: No hernia is present.   Genitourinary:     Penis: Normal.       Prostate: Normal.      Rectum: Normal. Guaiac result negative.   Musculoskeletal:         General: Normal " range of motion.      Cervical back: Normal range of motion and neck supple.   Lymphadenopathy:      Cervical: No cervical adenopathy.   Skin:     General: Skin is warm and dry.      Findings: No erythema or rash.   Neurological:      Mental Status: He is alert and oriented to person, place, and time.      Cranial Nerves: No cranial nerve deficit.      Motor: No abnormal muscle tone.      Coordination: Coordination normal.      Deep Tendon Reflexes: Reflexes are normal and symmetric.   Psychiatric:         Behavior: Behavior normal.         Thought Content: Thought content normal.         Judgment: Judgment normal.         Assessment & Plan   Diagnoses and all orders for this visit:    1. Physical exam (Primary)  -     CBC (No Diff)  -     Comprehensive Metabolic Panel  -     Urinalysis With Microscopic If Indicated (No Culture) - Urine, Clean Catch    2. Elevated blood pressure reading  Assessment & Plan:  Blood pressure is elevated in the office today, he will monitor readings at home and send those readings in 2 to 3 weeks.  If blood pressure remains consistently >140/90 will consider medication for further lowering.      3. Adult hypothyroidism  Assessment & Plan:  Synthroid is prescribed by physician in [place], recheck TSH today.      4. Low testosterone level in male  Assessment & Plan:  He continues under the care of of Dr. Rodriguez in [place], continue current medications.      5. Hypercholesteremia  Assessment & Plan:  LDL 80 but LDL particle 1376 with labs done in May.  He will continue red yeast rice along with a low-fat, low-cholesterol diet.      6. Screening for colon cancer  -     POC FECAL OCCULT BLOOD BY IMMUNOASSAY    7. Screening for prostate cancer  -     PSA Screen      Risk assessment:  He has a family hx (mother and father) of hypertension with an elevated reading today.  He will monitor blood pressure at home and notify me with those readings in 2 to 3 weeks.  His Body mass index is 34.5  kg/m². - He is going to be more active this summer with participation in [employer]  program.  Continue a low-fat, low-cholesterol diet along with regular exercise.    Prevention:  Health Maintenance   Topic Date Due   • ZOSTER VACCINE (1 of 2) Never done   • COVID-19 Vaccine (4 - Booster for Pfizer series) 02/25/2022   • ANNUAL PHYSICAL  03/24/2022   • COLORECTAL CANCER SCREENING  12/05/2022   • LIPID PANEL  05/13/2023   • TDAP/TD VACCINES (3 - Tdap) 01/24/2027   • HEPATITIS C SCREENING  Completed   • Pneumococcal Vaccine 0-64  Aged Out   • INFLUENZA VACCINE  Discontinued       Discussed healthy lifestyle choices such as maintaining a balanced diet low in carbohydrates and limiting caffeine and alcohol intake.  Recommended routine exercise for bone strength and cardiovascular health.    Form for participation in [employer] program completed today.

## 2022-05-25 NOTE — ASSESSMENT & PLAN NOTE
LDL 80 but LDL particle 1376 with labs done in May.  He will continue red yeast rice along with a low-fat, low-cholesterol diet.

## 2022-05-25 NOTE — ASSESSMENT & PLAN NOTE
Blood pressure is elevated in the office today, he will monitor readings at home and send those readings in 2 to 3 weeks.  If blood pressure remains consistently >140/90 will consider medication for further lowering.

## 2022-05-26 LAB
ALBUMIN SERPL-MCNC: 4.5 G/DL (ref 3.8–4.9)
ALBUMIN/GLOB SERPL: 1.9 {RATIO} (ref 1.2–2.2)
ALP SERPL-CCNC: 42 IU/L (ref 44–121)
ALT SERPL-CCNC: 16 IU/L (ref 0–44)
APPEARANCE UR: CLEAR
AST SERPL-CCNC: 14 IU/L (ref 0–40)
BILIRUB SERPL-MCNC: 0.3 MG/DL (ref 0–1.2)
BILIRUB UR QL STRIP: NEGATIVE
BUN SERPL-MCNC: 19 MG/DL (ref 6–24)
BUN/CREAT SERPL: 20 (ref 9–20)
CALCIUM SERPL-MCNC: 9.5 MG/DL (ref 8.7–10.2)
CHLORIDE SERPL-SCNC: 103 MMOL/L (ref 96–106)
CO2 SERPL-SCNC: 22 MMOL/L (ref 20–29)
COLOR UR: YELLOW
CREAT SERPL-MCNC: 0.96 MG/DL (ref 0.76–1.27)
EGFRCR SERPLBLD CKD-EPI 2021: 93 ML/MIN/1.73
ERYTHROCYTE [DISTWIDTH] IN BLOOD BY AUTOMATED COUNT: 13.2 % (ref 11.6–15.4)
GLOBULIN SER CALC-MCNC: 2.4 G/DL (ref 1.5–4.5)
GLUCOSE SERPL-MCNC: 100 MG/DL (ref 65–99)
GLUCOSE UR QL STRIP: NEGATIVE
HCT VFR BLD AUTO: 46.3 % (ref 37.5–51)
HGB BLD-MCNC: 15.3 G/DL (ref 13–17.7)
HGB UR QL STRIP: NEGATIVE
KETONES UR QL STRIP: NEGATIVE
LEUKOCYTE ESTERASE UR QL STRIP: NEGATIVE
MCH RBC QN AUTO: 28.1 PG (ref 26.6–33)
MCHC RBC AUTO-ENTMCNC: 33 G/DL (ref 31.5–35.7)
MCV RBC AUTO: 85 FL (ref 79–97)
MICRO URNS: NORMAL
NITRITE UR QL STRIP: NEGATIVE
PH UR STRIP: 5.5 [PH] (ref 5–7.5)
PLATELET # BLD AUTO: 217 X10E3/UL (ref 150–450)
POTASSIUM SERPL-SCNC: 5 MMOL/L (ref 3.5–5.2)
PROT SERPL-MCNC: 6.9 G/DL (ref 6–8.5)
PROT UR QL STRIP: NEGATIVE
PSA SERPL-MCNC: 0.7 NG/ML (ref 0–4)
RBC # BLD AUTO: 5.44 X10E6/UL (ref 4.14–5.8)
SODIUM SERPL-SCNC: 141 MMOL/L (ref 134–144)
SP GR UR STRIP: 1.02 (ref 1–1.03)
UROBILINOGEN UR STRIP-MCNC: 0.2 MG/DL (ref 0.2–1)
WBC # BLD AUTO: 5.5 X10E3/UL (ref 3.4–10.8)

## 2023-01-23 VITALS
DIASTOLIC BLOOD PRESSURE: 66 MMHG | SYSTOLIC BLOOD PRESSURE: 109 MMHG | HEART RATE: 65 BPM | HEART RATE: 47 BPM | HEART RATE: 59 BPM | HEART RATE: 56 BPM | RESPIRATION RATE: 15 BRPM | OXYGEN SATURATION: 95 % | DIASTOLIC BLOOD PRESSURE: 53 MMHG | HEIGHT: 69 IN | HEART RATE: 61 BPM | HEART RATE: 62 BPM | DIASTOLIC BLOOD PRESSURE: 60 MMHG | SYSTOLIC BLOOD PRESSURE: 112 MMHG | TEMPERATURE: 97.5 F | OXYGEN SATURATION: 99 % | HEART RATE: 68 BPM | HEART RATE: 55 BPM | DIASTOLIC BLOOD PRESSURE: 46 MMHG | RESPIRATION RATE: 17 BRPM | DIASTOLIC BLOOD PRESSURE: 49 MMHG | DIASTOLIC BLOOD PRESSURE: 76 MMHG | RESPIRATION RATE: 16 BRPM | OXYGEN SATURATION: 96 % | DIASTOLIC BLOOD PRESSURE: 61 MMHG | DIASTOLIC BLOOD PRESSURE: 82 MMHG | OXYGEN SATURATION: 93 % | SYSTOLIC BLOOD PRESSURE: 147 MMHG | SYSTOLIC BLOOD PRESSURE: 122 MMHG | HEART RATE: 57 BPM | RESPIRATION RATE: 13 BRPM | DIASTOLIC BLOOD PRESSURE: 43 MMHG | SYSTOLIC BLOOD PRESSURE: 105 MMHG | SYSTOLIC BLOOD PRESSURE: 113 MMHG | TEMPERATURE: 97.2 F | DIASTOLIC BLOOD PRESSURE: 59 MMHG | SYSTOLIC BLOOD PRESSURE: 145 MMHG | SYSTOLIC BLOOD PRESSURE: 100 MMHG | SYSTOLIC BLOOD PRESSURE: 143 MMHG | HEART RATE: 54 BPM | DIASTOLIC BLOOD PRESSURE: 67 MMHG | RESPIRATION RATE: 18 BRPM | SYSTOLIC BLOOD PRESSURE: 115 MMHG | OXYGEN SATURATION: 98 % | RESPIRATION RATE: 14 BRPM | SYSTOLIC BLOOD PRESSURE: 98 MMHG | OXYGEN SATURATION: 92 % | SYSTOLIC BLOOD PRESSURE: 133 MMHG | OXYGEN SATURATION: 94 % | RESPIRATION RATE: 11 BRPM | WEIGHT: 230 LBS | RESPIRATION RATE: 10 BRPM

## 2023-01-25 ENCOUNTER — AMBULATORY SURGICAL CENTER (AMBULATORY)
Dept: URBAN - METROPOLITAN AREA SURGERY 17 | Facility: SURGERY | Age: 58
End: 2023-01-25
Payer: COMMERCIAL

## 2023-01-25 DIAGNOSIS — K64.8 OTHER HEMORRHOIDS: ICD-10-CM

## 2023-01-25 DIAGNOSIS — Z80.0 FAMILY HISTORY OF MALIGNANT NEOPLASM OF DIGESTIVE ORGANS: ICD-10-CM

## 2023-01-25 DIAGNOSIS — Z86.010 PERSONAL HISTORY OF COLONIC POLYPS: ICD-10-CM

## 2023-01-25 PROCEDURE — 45378 DIAGNOSTIC COLONOSCOPY: CPT | Mod: 33 | Performed by: INTERNAL MEDICINE

## 2023-05-31 ENCOUNTER — OFFICE VISIT (OUTPATIENT)
Dept: INTERNAL MEDICINE | Facility: CLINIC | Age: 58
End: 2023-05-31
Payer: COMMERCIAL

## 2023-05-31 VITALS
HEART RATE: 68 BPM | WEIGHT: 229.4 LBS | HEIGHT: 69 IN | TEMPERATURE: 98.2 F | OXYGEN SATURATION: 96 % | SYSTOLIC BLOOD PRESSURE: 134 MMHG | DIASTOLIC BLOOD PRESSURE: 84 MMHG | BODY MASS INDEX: 33.98 KG/M2

## 2023-05-31 DIAGNOSIS — E03.9 ADULT HYPOTHYROIDISM: Chronic | ICD-10-CM

## 2023-05-31 DIAGNOSIS — E78.00 HYPERCHOLESTEREMIA: Chronic | ICD-10-CM

## 2023-05-31 DIAGNOSIS — Z00.00 PHYSICAL EXAM: Primary | ICD-10-CM

## 2023-05-31 DIAGNOSIS — R79.89 LOW TESTOSTERONE LEVEL IN MALE: Chronic | ICD-10-CM

## 2023-05-31 DIAGNOSIS — Z12.5 SCREENING FOR PROSTATE CANCER: ICD-10-CM

## 2023-05-31 DIAGNOSIS — E74.39 GLUCOSE INTOLERANCE: ICD-10-CM

## 2023-05-31 PROCEDURE — 99396 PREV VISIT EST AGE 40-64: CPT | Performed by: NURSE PRACTITIONER

## 2023-05-31 RX ORDER — METFORMIN HYDROCHLORIDE 500 MG/1
500 TABLET, EXTENDED RELEASE ORAL
Qty: 30 TABLET | Refills: 5
Start: 2023-05-31

## 2023-05-31 RX ORDER — LIOTHYRONINE SODIUM 100 %
POWDER (GRAM) MISCELLANEOUS
COMMUNITY

## 2023-05-31 NOTE — PROGRESS NOTES
Subjective   Tre Kenney is a 58 y.o. male who is here for a physical exam.    History of Present Illness  He is followed by an integrative medicine in [place] where he receives Clomid for low testosterone. He has brought recent labs which show an A1c of 5.8. He has made lifestyle changes due to elevated glucose, cholesterol; has lost ness 10# over the past 2 months by reducing his carb intake (ness 10-15 g carb/day).  He tested positive for COVID-19 in September, denies any residual symptoms.     The following portions of the patient's history were reviewed and updated as appropriate: allergies, current medications, past social history and problem list.    Past Medical History:   Diagnosis Date    Allergic 1973    Sulfa drugs    Colon polyp Dec. 2017    May have been some in previous colonoscopies    Headache     Occasional    History of medical problems 2008    Obstructive Sleep Apnea    HL (hearing loss)     Tinnitus    Hyperlipidemia     Hypothyroidism     Obesity most of life         Current Outpatient Medications:     Alpha-Lipoic Acid 100 MG capsule, Take 50 mg by mouth., Disp: , Rfl:     Bioflavonoid Products (VITAMIN C ER) 1000-100 MG tablet controlled-release, Daily., Disp: , Rfl:     Cholecalciferol (VITAMIN D3) 5000 UNITS capsule capsule, Take 1 capsule by mouth Daily., Disp: , Rfl:     clomiPHENE (CLOMID) 50 MG tablet, TAKE 1 TABLET BY MOUTH 3 TIMES PER WEEK, Disp: , Rfl:     Grape Seed Extract 50 MG capsule, Take  by mouth., Disp: , Rfl:     INOSITOL-FOLIC ACID PO, Take  by mouth., Disp: , Rfl:     Ivermectin 1 % cream, , Disp: , Rfl:     Liothyronine powder, , Disp: , Rfl:     multivitamin with minerals tablet tablet, , Disp: , Rfl:     Omega-3 Fatty Acids (FISH OIL) 1200 MG capsule capsule, , Disp: , Rfl:     Red Yeast Rice 600 MG tablet, 2 tablets twice a day (900mg), Disp: 1 each, Rfl: 5    Thyroid (LEVOTHYROXINE-LIOTHYRONINE PO), , Disp: , Rfl:     Unable to find, Eder Choleast 600 mg red  yeast rice+CoQ 10-3 in a.m., 3 in p.m., Disp: , Rfl:     Unable to find, Kyolic Reserve-aged garlic extract, 2 in am, 2 in pm, Disp: , Rfl:     metFORMIN ER (GLUCOPHAGE-XR) 500 MG 24 hr tablet, Take 1 tablet by mouth Daily With Breakfast., Disp: 30 tablet, Rfl: 5    Allergies   Allergen Reactions    Sulfa Antibiotics Rash       Review of Systems   Constitutional:  Negative for activity change, appetite change, chills, diaphoresis, fatigue, fever and unexpected weight change.   HENT:  Positive for congestion and postnasal drip. Negative for dental problem, drooling, ear discharge, ear pain, facial swelling, hearing loss, mouth sores, nosebleeds, rhinorrhea, sinus pressure, sore throat, tinnitus and trouble swallowing.    Eyes:  Negative for photophobia, pain, discharge, redness, itching and visual disturbance.   Respiratory:  Negative for apnea, cough, choking, chest tightness, shortness of breath and wheezing.    Cardiovascular:  Negative for chest pain, palpitations and leg swelling.        No orthopnea, PND, RANKIN   Gastrointestinal:  Negative for abdominal pain, blood in stool, constipation, diarrhea, nausea and vomiting.   Endocrine: Negative for cold intolerance, heat intolerance, polydipsia and polyuria.   Genitourinary:  Negative for decreased urine volume, dysuria, enuresis, flank pain, frequency, hematuria and urgency.   Musculoskeletal:  Positive for arthralgias. Negative for back pain, gait problem, joint swelling, myalgias, neck pain and neck stiffness.   Skin:  Negative for color change and rash.        No hair changes, no nail changes   Allergic/Immunologic: Negative for environmental allergies, food allergies and immunocompromised state.   Neurological:  Negative for dizziness, tremors, seizures, syncope, speech difficulty, weakness, light-headedness, numbness and headaches.   Hematological:  Negative for adenopathy. Does not bruise/bleed easily.   Psychiatric/Behavioral:  Negative for agitation,  "confusion, decreased concentration, dysphoric mood, sleep disturbance and suicidal ideas. The patient is not nervous/anxious.      Objective   Vitals:    05/31/23 0754 05/31/23 0833   BP: 130/86 134/84   BP Location: Left arm Right arm   Patient Position: Sitting Sitting   Cuff Size: Adult Adult   Pulse: 68    Temp: 98.2 °F (36.8 °C)    TempSrc: Temporal    SpO2: 96%    Weight: 104 kg (229 lb 6.4 oz)    Height: 175.3 cm (69\")      Physical Exam  Constitutional:       General: He is not in acute distress.     Appearance: Normal appearance. He is not diaphoretic.   HENT:      Head: Normocephalic and atraumatic.      Right Ear: Tympanic membrane, ear canal and external ear normal.      Left Ear: Tympanic membrane, ear canal and external ear normal.      Nose: Nose normal. No rhinorrhea.      Mouth/Throat:      Mouth: Mucous membranes are moist.      Pharynx: Oropharynx is clear.   Eyes:      General:         Right eye: No discharge.         Left eye: No discharge.      Conjunctiva/sclera: Conjunctivae normal.   Cardiovascular:      Rate and Rhythm: Normal rate and regular rhythm.      Pulses: Normal pulses.      Heart sounds: Normal heart sounds.   Pulmonary:      Effort: Pulmonary effort is normal.      Breath sounds: Normal breath sounds.   Abdominal:      General: Bowel sounds are normal.      Tenderness: There is no abdominal tenderness.   Musculoskeletal:         General: No swelling or tenderness.      Cervical back: Normal range of motion.   Skin:     General: Skin is warm and dry.   Neurological:      General: No focal deficit present.      Mental Status: He is alert and oriented to person, place, and time.   Psychiatric:         Mood and Affect: Mood normal.         Behavior: Behavior normal.         Judgment: Judgment normal.       Assessment & Plan   Diagnoses and all orders for this visit:    1. Physical exam (Primary)  -     CBC & Differential    2. Glucose intolerance  Assessment & Plan:  He was started " on Metformin 3/2023 due to elevated A1c of 5.8    Orders:  -     metFORMIN ER (GLUCOPHAGE-XR) 500 MG 24 hr tablet; Take 1 tablet by mouth Daily With Breakfast.  Dispense: 30 tablet; Refill: 5    3. Hypercholesteremia  Assessment & Plan:  LDL 93 with 3/2023, LDL particle 1568; taking red yeast rice and following a low-fat, low-cholesterol diet.      4. Adult hypothyroidism  Assessment & Plan:  He is managed on Thyroid replacement by Integrative medicine physician in [place]      5. Low testosterone level in male  Assessment & Plan:  Managed on Clomid daily(Dr. Rodriguez, [place])      6. Screening for prostate cancer  -     PSA Screen        Risk assessment:  He has a family hx (mother) of hypothyroidism and colon cancer (father)-receives colonoscopy every 5 years.  His Body mass index is 33.88 kg/m². - He has lost 10# over the past month by reducing carb intake.    Prevention:  Health Maintenance   Topic Date Due    ZOSTER VACCINE (1 of 2) Never done    ANNUAL PHYSICAL  05/25/2023    INFLUENZA VACCINE  08/01/2023    LIPID PANEL  03/11/2024    TDAP/TD VACCINES (3 - Tdap) 01/24/2027    COLORECTAL CANCER SCREENING  01/25/2028    HEPATITIS C SCREENING  Completed    COVID-19 Vaccine  Completed    Pneumococcal Vaccine 0-64  Aged Out       Discussed healthy lifestyle choices such as maintaining a balanced diet low in carbohydrates and limiting caffeine and alcohol intake.  Recommended routine exercise for bone strength and cardiovascular health.

## 2023-06-04 LAB
BASOPHILS # BLD AUTO: 0.1 X10E3/UL (ref 0–0.2)
BASOPHILS NFR BLD AUTO: 1 %
EOSINOPHIL # BLD AUTO: 0.1 X10E3/UL (ref 0–0.4)
EOSINOPHIL NFR BLD AUTO: 2 %
ERYTHROCYTE [DISTWIDTH] IN BLOOD BY AUTOMATED COUNT: 13.2 % (ref 11.6–15.4)
HCT VFR BLD AUTO: 45.9 % (ref 37.5–51)
HGB BLD-MCNC: 15.1 G/DL (ref 13–17.7)
IMM GRANULOCYTES # BLD AUTO: 0 X10E3/UL (ref 0–0.1)
IMM GRANULOCYTES NFR BLD AUTO: 0 %
LYMPHOCYTES # BLD AUTO: 2.3 X10E3/UL (ref 0.7–3.1)
LYMPHOCYTES NFR BLD AUTO: 40 %
MCH RBC QN AUTO: 28 PG (ref 26.6–33)
MCHC RBC AUTO-ENTMCNC: 32.9 G/DL (ref 31.5–35.7)
MCV RBC AUTO: 85 FL (ref 79–97)
MONOCYTES # BLD AUTO: 0.6 X10E3/UL (ref 0.1–0.9)
MONOCYTES NFR BLD AUTO: 11 %
NEUTROPHILS # BLD AUTO: 2.6 X10E3/UL (ref 1.4–7)
NEUTROPHILS NFR BLD AUTO: 46 %
PLATELET # BLD AUTO: 248 X10E3/UL (ref 150–450)
PSA SERPL-MCNC: 0.7 NG/ML (ref 0–4)
RBC # BLD AUTO: 5.4 X10E6/UL (ref 4.14–5.8)
WBC # BLD AUTO: 5.7 X10E3/UL (ref 3.4–10.8)

## 2023-06-11 NOTE — ASSESSMENT & PLAN NOTE
LDL 93 with 3/2023, LDL particle 1568; taking red yeast rice and following a low-fat, low-cholesterol diet.

## 2024-03-06 ENCOUNTER — PATIENT MESSAGE (OUTPATIENT)
Dept: INTERNAL MEDICINE | Facility: CLINIC | Age: 59
End: 2024-03-06
Payer: COMMERCIAL

## 2024-03-08 ENCOUNTER — OFFICE VISIT (OUTPATIENT)
Dept: INTERNAL MEDICINE | Facility: CLINIC | Age: 59
End: 2024-03-08
Payer: COMMERCIAL

## 2024-03-08 VITALS
OXYGEN SATURATION: 99 % | DIASTOLIC BLOOD PRESSURE: 90 MMHG | SYSTOLIC BLOOD PRESSURE: 130 MMHG | TEMPERATURE: 98.5 F | HEIGHT: 69 IN | WEIGHT: 225 LBS | BODY MASS INDEX: 33.33 KG/M2 | HEART RATE: 70 BPM

## 2024-03-08 DIAGNOSIS — E03.9 ADULT HYPOTHYROIDISM: Chronic | ICD-10-CM

## 2024-03-08 DIAGNOSIS — R00.2 PALPITATIONS: Primary | ICD-10-CM

## 2024-03-08 PROCEDURE — 99214 OFFICE O/P EST MOD 30 MIN: CPT | Performed by: NURSE PRACTITIONER

## 2024-03-08 RX ORDER — NICOTINE 14MG/24HR
PATCH, TRANSDERMAL 24 HOURS TRANSDERMAL
COMMUNITY

## 2024-03-08 RX ORDER — KETOCONAZOLE 20 MG/G
CREAM TOPICAL
COMMUNITY
Start: 2024-02-20

## 2024-03-08 NOTE — PROGRESS NOTES
"Chief Complaint  Spasms (CHEST)  Subjective        Tre Kenney presents to [facility]  History of Present Illness    The patient is a 59-year-old male who presents due to spasms in chest.    Heart spasms  The patient has been experiencing spasms a few times a week in the anterior portion of his chest for a while. It does not seem to be in the surface muscle, but it is deeper. It happens when he is just sitting still and not doing anything in the evening. He does not notice it at any other time of the day. He hears a skipped heartbeat occasionally when he is lying on his side. He is not sure if it happens every time. He denies dyspnea. He has noticed it sometimes in bed, but he notices it more when he is just sitting. He does not feel tachycardic.     Dr. Khan has been checking his cholesterol and blood glucose, but not his thyroid levels. The patient claims that his HDL and LDL have always been reversed. He takes over-the-counter D-Hist for decongestant. He has been on thyroid medication for a long time. They had tried increasing the dose of T3, and he developed more spasms and back pain after a week.     Dizziness  He denies any dizziness in normal circumstances; however, he attends a cycle class once a week and when his heart rate is elevated, he is fine when he is standing, but when he sits down, he experiences dizziness. This is a new symptom. His endurance is about the same. He had a coronary calcium scan 4 years ago at [facility].    The patient had an upper respiratory infection this past winter which resulted in a lingering cough.    Objective   Vital Signs:  /90 (BP Location: Left arm, Patient Position: Sitting, Cuff Size: Adult)   Pulse 70   Temp 98.5 °F (36.9 °C) (Oral)   Ht 175.3 cm (69\")   Wt 102 kg (225 lb)   SpO2 99%   BMI 33.23 kg/m²   Estimated body mass index is 33.23 kg/m² as calculated from the following:    Height as of this " "encounter: 175.3 cm (69\").    Weight as of this encounter: 102 kg (225 lb).    BMI is >= 30 and <35. (Class 1 Obesity). The following options were offered after discussion;: exercise counseling/recommendations and nutrition counseling/recommendations      Physical Exam  Constitutional:       Appearance: He is well-developed. He is not ill-appearing.   HENT:      Head: Normocephalic.      Right Ear: Hearing, tympanic membrane and external ear normal.      Left Ear: Hearing, tympanic membrane and external ear normal.      Nose: Nose normal. No nasal deformity, mucosal edema or rhinorrhea.      Right Sinus: No maxillary sinus tenderness or frontal sinus tenderness.      Left Sinus: No maxillary sinus tenderness or frontal sinus tenderness.      Mouth/Throat:      Dentition: Normal dentition.   Eyes:      General: Lids are normal.         Right eye: No discharge.         Left eye: No discharge.      Conjunctiva/sclera: Conjunctivae normal.      Right eye: No exudate.     Left eye: No exudate.  Neck:      Thyroid: No thyroid mass or thyromegaly.      Vascular: No carotid bruit.      Trachea: Trachea normal.   Cardiovascular:      Rate and Rhythm: Regular rhythm.      Pulses: Normal pulses.      Heart sounds: Normal heart sounds. No murmur heard.  Pulmonary:      Effort: No respiratory distress.      Breath sounds: Normal breath sounds. No decreased breath sounds, wheezing, rhonchi or rales.   Abdominal:      General: Bowel sounds are normal.      Palpations: Abdomen is soft.      Tenderness: There is no abdominal tenderness.   Musculoskeletal:      Cervical back: Normal range of motion. No edema.   Lymphadenopathy:      Head:      Right side of head: No submental, submandibular, tonsillar, preauricular, posterior auricular or occipital adenopathy.      Left side of head: No submental, submandibular, tonsillar, preauricular, posterior auricular or occipital adenopathy.   Skin:     General: Skin is warm and dry.      " Nails: There is no clubbing.   Neurological:      Mental Status: He is alert.   Psychiatric:         Behavior: Behavior is cooperative.        Result Review :  The following data was reviewed by: KARON Jenkins on 03/08/2024:  Common labs          6/3/2023    08:40   Common Labs   WBC 5.7    Hemoglobin 15.1    Hematocrit 45.9    Platelets 248    PSA 0.7      Data reviewed : Radiologic studies Coronary calcium score      Coronary artery plaque analysis was reviewed with the patient. Coronary arteries are normal. Coronary calcium score is 0.       Assessment and Plan   Diagnoses and all orders for this visit:    1. Palpitations (Primary)  Comments:  I will order a Holter monitor and an echocardiogram.  Assessment & Plan:  He notes intermittent episodes of spasms in his chest wall with a sensation that his heart is beating irregularly.  Normal exam today with no arrhythmias.  Will obtain an echo and a Holter monitor to further evaluate symptoms.    Orders:  -     Adult Transthoracic Echo Complete W/ Cont if Necessary Per Protocol; Future  -     Holter Monitor - 72 Hour Up To 15 Days; Future    2. Adult hypothyroidism  Assessment & Plan:  We discussed that if his TSH is not within the therapeutic range this could potentially cause palpitations.  He is followed by an integrative medicine physician in [place].  He will be due for lab work next month.        Palpitations.  I will order a Holter monitor and an echocardiogram.         Follow Up   Return if symptoms worsen or fail to improve, for Next scheduled follow up.  Patient was given instructions and counseling regarding his condition or for health maintenance advice. Please see specific information pulled into the AVS if appropriate.     Transcribed from ambient dictation for KARON Jenkins by Carrie Granados.  03/08/24   11:37 EST    Patient or patient representative verbalized consent to the visit recording.  I have personally performed the  services described in this document as transcribed by the above individual, and it is both accurate and complete.

## 2024-03-10 PROBLEM — R00.2 PALPITATIONS: Status: ACTIVE | Noted: 2024-03-10

## 2024-03-10 NOTE — ASSESSMENT & PLAN NOTE
We discussed that if his TSH is not within the therapeutic range this could potentially cause palpitations.  He is followed by an integrative medicine physician in [place].  He will be due for lab work next month.

## 2024-03-10 NOTE — ASSESSMENT & PLAN NOTE
He notes intermittent episodes of spasms in his chest wall with a sensation that his heart is beating irregularly.  Normal exam today with no arrhythmias.  Will obtain an echo and a Holter monitor to further evaluate symptoms.

## 2024-04-11 ENCOUNTER — HOSPITAL ENCOUNTER (OUTPATIENT)
Dept: CARDIOLOGY | Facility: HOSPITAL | Age: 59
Discharge: HOME | End: 2024-04-11
Payer: COMMERCIAL

## 2024-04-11 VITALS
SYSTOLIC BLOOD PRESSURE: 126 MMHG | BODY MASS INDEX: 33.33 KG/M2 | HEART RATE: 61 BPM | WEIGHT: 225 LBS | HEIGHT: 69 IN | DIASTOLIC BLOOD PRESSURE: 78 MMHG

## 2024-04-11 DIAGNOSIS — R00.2 PALPITATIONS: ICD-10-CM

## 2024-04-11 LAB
AORTIC DIMENSIONLESS INDEX: 0.8 (DI)
ASCENDING AORTA: 3.3 CM
BH CV ECHO MEAS - ACS: 2.17 CM
BH CV ECHO MEAS - AO MAX PG: 10.1 MMHG
BH CV ECHO MEAS - AO MEAN PG: 5.5 MMHG
BH CV ECHO MEAS - AO ROOT DIAM: 3 CM
BH CV ECHO MEAS - AO V2 MAX: 158.8 CM/SEC
BH CV ECHO MEAS - AO V2 VTI: 35.4 CM
BH CV ECHO MEAS - AVA(I,D): 2.4 CM2
BH CV ECHO MEAS - EDV(CUBED): 123.4 ML
BH CV ECHO MEAS - EDV(MOD-SP2): 83 ML
BH CV ECHO MEAS - EDV(MOD-SP4): 83 ML
BH CV ECHO MEAS - EF(MOD-BP): 61 %
BH CV ECHO MEAS - EF(MOD-SP2): 60.2 %
BH CV ECHO MEAS - EF(MOD-SP4): 61.4 %
BH CV ECHO MEAS - ESV(CUBED): 29.5 ML
BH CV ECHO MEAS - ESV(MOD-SP2): 33 ML
BH CV ECHO MEAS - ESV(MOD-SP4): 32 ML
BH CV ECHO MEAS - FS: 38 %
BH CV ECHO MEAS - IVS/LVPW: 1.13 CM
BH CV ECHO MEAS - IVSD: 1.24 CM
BH CV ECHO MEAS - LA DIMENSION: 3.8 CM
BH CV ECHO MEAS - LAT PEAK E' VEL: 8.8 CM/SEC
BH CV ECHO MEAS - LV DIASTOLIC VOL/BSA (35-75): 38.2 CM2
BH CV ECHO MEAS - LV MASS(C)D: 225.5 GRAMS
BH CV ECHO MEAS - LV MAX PG: 5.7 MMHG
BH CV ECHO MEAS - LV MEAN PG: 2.7 MMHG
BH CV ECHO MEAS - LV SYSTOLIC VOL/BSA (12-30): 14.7 CM2
BH CV ECHO MEAS - LV V1 MAX: 119.8 CM/SEC
BH CV ECHO MEAS - LV V1 VTI: 28.1 CM
BH CV ECHO MEAS - LVIDD: 5 CM
BH CV ECHO MEAS - LVIDS: 3.1 CM
BH CV ECHO MEAS - LVOT AREA: 3 CM2
BH CV ECHO MEAS - LVOT DIAM: 1.96 CM
BH CV ECHO MEAS - LVPWD: 1.11 CM
BH CV ECHO MEAS - MED PEAK E' VEL: 5.9 CM/SEC
BH CV ECHO MEAS - MV A DUR: 0.21 SEC
BH CV ECHO MEAS - MV A MAX VEL: 90.4 CM/SEC
BH CV ECHO MEAS - MV DEC SLOPE: 298.3 CM/SEC2
BH CV ECHO MEAS - MV DEC TIME: 246 SEC
BH CV ECHO MEAS - MV E MAX VEL: 78 CM/SEC
BH CV ECHO MEAS - MV E/A: 0.86
BH CV ECHO MEAS - MV MAX PG: 4.1 MMHG
BH CV ECHO MEAS - MV MEAN PG: 1.66 MMHG
BH CV ECHO MEAS - MV P1/2T: 89.3 MSEC
BH CV ECHO MEAS - MV V2 VTI: 38.4 CM
BH CV ECHO MEAS - MVA(P1/2T): 2.46 CM2
BH CV ECHO MEAS - MVA(VTI): 2.21 CM2
BH CV ECHO MEAS - PA ACC TIME: 0.16 SEC
BH CV ECHO MEAS - PA V2 MAX: 118.1 CM/SEC
BH CV ECHO MEAS - PULM A REVS DUR: 0.17 SEC
BH CV ECHO MEAS - PULM A REVS VEL: 38.1 CM/SEC
BH CV ECHO MEAS - PULM DIAS VEL: 62.8 CM/SEC
BH CV ECHO MEAS - PULM S/D: 0.91
BH CV ECHO MEAS - PULM SYS VEL: 57.1 CM/SEC
BH CV ECHO MEAS - QP/QS: 1.49
BH CV ECHO MEAS - RAP SYSTOLE: 3 MMHG
BH CV ECHO MEAS - RV MAX PG: 2.6 MMHG
BH CV ECHO MEAS - RV V1 MAX: 80.6 CM/SEC
BH CV ECHO MEAS - RV V1 VTI: 18.8 CM
BH CV ECHO MEAS - RVDD: 2.5 CM
BH CV ECHO MEAS - RVOT DIAM: 2.9 CM
BH CV ECHO MEAS - RVSP: 24 MMHG
BH CV ECHO MEAS - SI(MOD-SP2): 23 ML/M2
BH CV ECHO MEAS - SI(MOD-SP4): 23.5 ML/M2
BH CV ECHO MEAS - SUP REN AO DIAM: 2.8 CM
BH CV ECHO MEAS - SV(LVOT): 84.9 ML
BH CV ECHO MEAS - SV(MOD-SP2): 50 ML
BH CV ECHO MEAS - SV(MOD-SP4): 51 ML
BH CV ECHO MEAS - SV(RVOT): 126.7 ML
BH CV ECHO MEAS - TAPSE (>1.6): 2.04 CM
BH CV ECHO MEAS - TR MAX PG: 21 MMHG
BH CV ECHO MEAS - TR MAX VEL: 229.4 CM/SEC
BH CV ECHO MEASUREMENTS AVERAGE E/E' RATIO: 10.61
BH CV XLRA - RV BASE: 2.8 CM
BH CV XLRA - RV LENGTH: 6.3 CM
BH CV XLRA - RV MID: 2.6 CM
BH CV XLRA - TDI S': 9.9 CM/SEC
LEFT ATRIUM VOLUME INDEX: 21.1 ML/M2
SINUS: 2.6 CM
STJ: 2.7 CM

## 2024-04-11 PROCEDURE — 93306 TTE W/DOPPLER COMPLETE: CPT

## 2024-04-11 PROCEDURE — 93246 EXT ECG>7D<15D RECORDING: CPT

## 2024-05-31 ENCOUNTER — OFFICE VISIT (OUTPATIENT)
Dept: INTERNAL MEDICINE | Facility: CLINIC | Age: 59
End: 2024-05-31
Payer: COMMERCIAL

## 2024-05-31 VITALS
SYSTOLIC BLOOD PRESSURE: 130 MMHG | OXYGEN SATURATION: 96 % | WEIGHT: 223 LBS | HEART RATE: 73 BPM | TEMPERATURE: 98.2 F | HEIGHT: 69 IN | DIASTOLIC BLOOD PRESSURE: 80 MMHG | BODY MASS INDEX: 33.03 KG/M2

## 2024-05-31 DIAGNOSIS — Z12.5 SCREENING FOR PROSTATE CANCER: ICD-10-CM

## 2024-05-31 DIAGNOSIS — Z00.00 PE (PHYSICAL EXAM), ANNUAL: Primary | ICD-10-CM

## 2024-05-31 PROCEDURE — 99396 PREV VISIT EST AGE 40-64: CPT | Performed by: NURSE PRACTITIONER

## 2024-06-06 LAB — PSA SERPL-MCNC: 0.8 NG/ML (ref 0–4)

## 2024-06-17 NOTE — PROGRESS NOTES
Subjective   Tre Kenney is a 59 y.o. male who is here for a physical exam.    History of Present Illness   History of Present Illness  The patient is a 59-year-old male who presents for evaluation of multiple medical concerns.    The patient is currently enrolled in [employer] program through his workplace and has recently procured a Boy  [employer]. He is scheduled to meet his  on Monday. Despite adhering to a low-carbohydrate diet, he has been unable to adhere to the ketogenic diet. His  has proposed the ketogenic diet, with the aim of managing his prediabetes rather than initiating a ketogenic diet. The patient does not frequently take Advil or Aleve. He has updated his supplements. He is allergic to SULFA but denies any food allergies. He is exposed to plants but denies any facial swelling related to insect bites. He experiences occasional headaches, dizziness, and lightheadedness during cycling classes, which resolve upon sitting. He denies any neck, lower back, or unexpected shortness of breath. He experiences occasional heartburn and indigestion, for which he takes berberine. He takes one metformin tablet daily without any loose stools. His last thyroid labs were conducted on 03/27/2024. He maintains an active lifestyle.    The patient continues to experience palpitations once or twice weekly, albeit less severe than before. He experienced a period of exhaustion and depression towards the end of the previous year, but commenced therapy, which he reports as beneficial. He plans to attend camp on 06/16/2024. He increased his magnesium intake around the time his palpitations began to decrease.    Supplemental Information  His left hip gets stiff on him because he sleeps on his left side mostly. The pain does not go down his leg. He bashed his shin in the spring of last year and he still has a bump.   He is allergic to SULFA.      Past Medical History:   Diagnosis Date    Allergic 1973    Sulfa drugs     Colon polyp Dec. 2017    May have been some in previous colonoscopies    Headache     Occasional    History of medical problems 2008    Obstructive Sleep Apnea    HL (hearing loss)     Tinnitus    Hyperlipidemia     Hypothyroidism     Obesity most of life         Current Outpatient Medications:     Alpha-Lipoic Acid 100 MG capsule, Take 50 mg by mouth., Disp: , Rfl:     Bioflavonoid Products (VITAMIN C ER) 1000-100 MG tablet controlled-release, Daily., Disp: , Rfl:     Cholecalciferol (VITAMIN D3) 5000 UNITS capsule capsule, Take 1 capsule by mouth Daily., Disp: , Rfl:     clomiPHENE (CLOMID) 50 MG tablet, TAKE 1 TABLET BY MOUTH 3 TIMES PER WEEK, Disp: , Rfl:     Ivermectin 1 % cream, , Disp: , Rfl:     ketoconazole (NIZORAL) 2 % cream, , Disp: , Rfl:     levothyroxine (SYNTHROID), , Disp: , Rfl:     Liothyronine powder, , Disp: , Rfl:     metFORMIN ER (GLUCOPHAGE-XR) 750 MG 24 hr tablet, Take 1 tablet by mouth Daily With Breakfast., Disp: , Rfl:     multivitamin with minerals tablet tablet, , Disp: , Rfl:     Niacin (NICOTINIC ACID ER PO), , Disp: , Rfl:     Omega-3 Fatty Acids (FISH OIL) 1200 MG capsule capsule, , Disp: , Rfl:     Rhodiola rosea (RHODIOLA PO), , Disp: , Rfl:     Saccharomyces boulardii (Probiotic) 250 MG capsule, , Disp: , Rfl:     Unable to find, Eder Choleast 600 mg red yeast rice+CoQ 10-3 in a.m., 3 in p.m., Disp: , Rfl:     Unable to find, Kyolic Reserve-aged garlic extract, 2 in am, 2 in pm, Disp: , Rfl:     Unable to find, 1 each 1 (One) Time. T-3 (LIOTHYRONINE) 25 MCG E4M CAPSULE  TAKE 2 CapsuleS BY MOUTH EVERY MORNING AND 1 in THE afternoon, Disp: , Rfl:     Allergies   Allergen Reactions    Sulfa Antibiotics Rash       Review of Systems   Constitutional:  Negative for activity change, appetite change, chills, diaphoresis, fatigue, fever and unexpected weight change.   HENT:  Positive for congestion, postnasal drip and rhinorrhea. Negative for dental problem, drooling, ear discharge, ear  "pain, facial swelling, hearing loss, mouth sores, nosebleeds, sinus pressure, sore throat, tinnitus and trouble swallowing.    Eyes:  Negative for photophobia, pain, discharge, redness, itching and visual disturbance.   Respiratory:  Negative for apnea, cough, choking, chest tightness, shortness of breath and wheezing.    Cardiovascular:  Negative for chest pain, palpitations and leg swelling.        No orthopnea, PND, RANKIN   Gastrointestinal:  Negative for abdominal pain, blood in stool, constipation, diarrhea, nausea and vomiting.   Endocrine: Negative for cold intolerance, heat intolerance, polydipsia and polyuria.   Genitourinary:  Negative for decreased urine volume, dysuria, enuresis, flank pain, frequency, hematuria and urgency.   Musculoskeletal:  Negative for arthralgias, back pain, gait problem, joint swelling, myalgias, neck pain and neck stiffness.   Skin:  Negative for color change and rash.        No hair changes, no nail changes   Allergic/Immunologic: Negative for environmental allergies, food allergies and immunocompromised state.   Neurological:  Negative for dizziness, tremors, seizures, syncope, speech difficulty, weakness, light-headedness, numbness and headaches.   Hematological:  Negative for adenopathy. Does not bruise/bleed easily.   Psychiatric/Behavioral:  Negative for agitation, confusion, decreased concentration, dysphoric mood, sleep disturbance and suicidal ideas. The patient is not nervous/anxious.        Objective   Vitals:    05/31/24 0913   BP: 130/80   BP Location: Left arm   Patient Position: Sitting   Cuff Size: Adult   Pulse: 73   Temp: 98.2 °F (36.8 °C)   SpO2: 96%   Weight: 101 kg (223 lb)   Height: 175.3 cm (69\")     Physical Exam  Constitutional:       General: He is not in acute distress.     Appearance: Normal appearance. He is not diaphoretic.   HENT:      Head: Normocephalic and atraumatic.      Right Ear: Tympanic membrane, ear canal and external ear normal.      Left " Ear: Tympanic membrane, ear canal and external ear normal.      Nose: Nose normal. No rhinorrhea.      Mouth/Throat:      Mouth: Mucous membranes are moist.      Pharynx: Oropharynx is clear.   Eyes:      General:         Right eye: No discharge.         Left eye: No discharge.      Conjunctiva/sclera: Conjunctivae normal.   Cardiovascular:      Rate and Rhythm: Normal rate and regular rhythm.      Pulses: Normal pulses.      Heart sounds: Normal heart sounds.   Pulmonary:      Effort: Pulmonary effort is normal.      Breath sounds: Normal breath sounds.   Abdominal:      General: Bowel sounds are normal.      Tenderness: There is no abdominal tenderness.   Musculoskeletal:         General: No swelling or tenderness.      Cervical back: Normal range of motion.   Skin:     General: Skin is warm and dry.   Neurological:      General: No focal deficit present.      Mental Status: He is alert and oriented to person, place, and time.   Psychiatric:         Mood and Affect: Mood normal.         Behavior: Behavior normal.         Judgment: Judgment normal.       Physical Exam  Vital Signs  Patient's height is 69 inches. Patient's weight is 223 pounds.    Results  Laboratory Studies  Hemoglobin A1c is 5.8. Creatinine is 0.81. GFR is 1. Sodium, potassium, and liver enzymes are normal. Total cholesterol is 148, LDL is 86, and HDL is 39.    Testing  Ejection fraction is 61 to 65. Holter monitor showed PACs.       Assessment & Plan   Diagnoses and all orders for this visit:    1. PE (physical exam), annual (Primary)    2. Screening for prostate cancer  -     Cancel: PSA Screen; Future  -     PSA Screen; Future      Assessment & Plan  1. Palpitations.  The patient's palpitations were identified as PACs, however, no atrial fibrillation or supraventricular tachycardia was detected. The patient's blood pressure is well-regulated. Should the patient's palpitations intensify, he is to inform us immediately.    2. Health  maintenance.  A Prostate-Specific Antigen (PSA) test will be ordered.    Risk Assessment:  Family History   Problem Relation Age of Onset    Hypothyroidism Mother     Hypertension Mother     Emphysema Mother     Arthritis Mother     COPD Mother     Hearing loss Mother         wears hearing aids    Heart disease Mother         Heart attack    Thyroid disease Mother         Hypothyroid--slight    Lung cancer Father     Colon cancer Father     Hypertension Father     Emphysema Father     Alcohol abuse Father     Cancer Father         Colon, lung, skin    Vision loss Father         Due to treatment for lung cancer    Prostate cancer Brother     ADD / ADHD Son     Asperger's syndrome Son     Anxiety disorder Son     Depression Son     Cancer Maternal Grandmother     Cancer Paternal Grandmother     Anxiety disorder Son     Depression Son     Developmental Disability Son         Asperger's    Drug abuse Brother         Half brother     His Body mass index is 32.93 kg/m²..      Prevention:  Health Maintenance   Topic Date Due    ZOSTER VACCINE (1 of 2) Never done    LIPID PANEL  03/11/2024    ANNUAL PHYSICAL  05/31/2024    INFLUENZA VACCINE  08/01/2024    BMI FOLLOWUP  03/08/2025    TDAP/TD VACCINES (3 - Tdap) 01/24/2027    COLORECTAL CANCER SCREENING  01/25/2028    HEPATITIS C SCREENING  Completed    COVID-19 Vaccine  Completed    Pneumococcal Vaccine 0-64  Aged Out       Discussed healthy lifestyle choices such as maintaining a balanced diet low in carbohydrates and limiting caffeine and alcohol intake.  Recommended routine exercise for bone strength and cardiovascular health.         Patient or patient representative verbalized consent for the use of Ambient Listening during the visit with  KAORN Jenkins for chart documentation. 6/16/2024  21:38 EDT      Answers submitted by the patient for this visit:  Primary Reason for Visit (Submitted on 5/31/2024)  What is the primary reason for your visit?: Other  Other  (Submitted on 5/31/2024)  Please describe your symptoms.: Annual physical  Have you had these symptoms before?: No  How long have you been having these symptoms?: Greater than 2 weeks

## 2024-07-12 ENCOUNTER — OFFICE VISIT (OUTPATIENT)
Dept: SLEEP MEDICINE | Facility: HOSPITAL | Age: 59
End: 2024-07-12
Payer: COMMERCIAL

## 2024-07-12 ENCOUNTER — TELEPHONE (OUTPATIENT)
Dept: SLEEP MEDICINE | Facility: HOSPITAL | Age: 59
End: 2024-07-12
Payer: COMMERCIAL

## 2024-07-12 VITALS
OXYGEN SATURATION: 92 % | SYSTOLIC BLOOD PRESSURE: 127 MMHG | BODY MASS INDEX: 32.29 KG/M2 | WEIGHT: 218 LBS | HEIGHT: 69 IN | HEART RATE: 64 BPM | DIASTOLIC BLOOD PRESSURE: 81 MMHG

## 2024-07-12 DIAGNOSIS — E66.9 OBESITY (BMI 30-39.9): ICD-10-CM

## 2024-07-12 DIAGNOSIS — G47.10 PERSISTENT HYPERSOMNOLENCE DISORDER: ICD-10-CM

## 2024-07-12 DIAGNOSIS — R68.2 DRY MOUTH: ICD-10-CM

## 2024-07-12 DIAGNOSIS — G47.33 OBSTRUCTIVE SLEEP APNEA, ADULT: Primary | ICD-10-CM

## 2024-07-12 PROCEDURE — G0463 HOSPITAL OUTPT CLINIC VISIT: HCPCS

## 2024-07-12 NOTE — PROGRESS NOTES
[facility] SLEEP MEDICINE  [address]  [place] [address] [phone]    Referring Physician: Self  PCP: Pati Bashir APRN    Reason for consult:  Sleep disorders of HARPREET    Tre Kenney is a 59 y.o.male was [facility]. Needs new prescription. On CPAP since 2007. Using nasal pillows, getting excessive dry mouth. Some wt loss. Sleeps from 10pm to 7am. He is still tired when he awakens. Feels sleepy during the day. Not snoring with device.  Benton Sleepiness Score: 10. Caffeine intake 1-2 per day. Alcohol intake <1 per week.    Tre Kenney  has a past medical history of Allergic (1973), Colon polyp (Dec. 2017), Headache, History of medical problems (2008), HL (hearing loss), Hyperlipidemia, Hypothyroidism, and Obesity (most of life).     Current Medications:    Current Outpatient Medications:     Alpha-Lipoic Acid 100 MG capsule, Take 50 mg by mouth., Disp: , Rfl:     Bioflavonoid Products (VITAMIN C ER) 1000-100 MG tablet controlled-release, Daily., Disp: , Rfl:     Cholecalciferol (VITAMIN D3) 5000 UNITS capsule capsule, Take 1 capsule by mouth Daily., Disp: , Rfl:     clomiPHENE (CLOMID) 50 MG tablet, TAKE 1 TABLET BY MOUTH 3 TIMES PER WEEK, Disp: , Rfl:     Ivermectin 1 % cream, , Disp: , Rfl:     ketoconazole (NIZORAL) 2 % cream, , Disp: , Rfl:     levothyroxine (SYNTHROID), , Disp: , Rfl:     Liothyronine powder, , Disp: , Rfl:     metFORMIN ER (GLUCOPHAGE-XR) 750 MG 24 hr tablet, Take 1 tablet by mouth Daily With Breakfast., Disp: , Rfl:     multivitamin with minerals tablet tablet, , Disp: , Rfl:     Niacin (NICOTINIC ACID ER PO), , Disp: , Rfl:     Omega-3 Fatty Acids (FISH OIL) 1200 MG capsule capsule, , Disp: , Rfl:     Rhodiola rosea (RHODIOLA PO), , Disp: , Rfl:     Saccharomyces boulardii (Probiotic) 250 MG capsule, , Disp: , Rfl:     Unable to find, Eder Wang 600 mg red yeast rice+CoQ 10-3 in a.m., 3 in p.m., Disp: , Rfl:  "    Unable to find, Kyolic Reserve-aged garlic extract, 2 in am, 2 in pm, Disp: , Rfl:     Unable to find, 1 each 1 (One) Time. T-3 (LIOTHYRONINE) 25 MCG E4M CAPSULE  TAKE 2 CapsuleS BY MOUTH EVERY MORNING AND 1 in THE afternoon, Disp: , Rfl:    also listed in Sleep Questionnaire.    FH: family history includes ADD / ADHD in his son; Alcohol abuse in his father; Anxiety disorder in his son and son; Arthritis in his mother; Asperger's syndrome in his son; COPD in his mother; Cancer in his father, maternal grandmother, and paternal grandmother; Colon cancer in his father; Depression in his son and son; Developmental Disability in his son; Drug abuse in his brother; Emphysema in his father and mother; Hearing loss in his mother; Heart disease in his mother; Hypertension in his father and mother; Hypothyroidism in his mother; Lung cancer in his father; Prostate cancer in his brother; Thyroid disease in his mother; Vision loss in his father.  SH:  reports that he has never smoked. He has never used smokeless tobacco. He reports current alcohol use. He reports that he does not use drugs.    Pertinent Positive Review of Systems of nasal congestion, pnd  Rest of Review of Systems was negative as recorded in Sleep Questionnaire.        Vital Signs: /81   Pulse 64   Ht 175.3 cm (69\")   Wt 98.9 kg (218 lb)   SpO2 92%   BMI 32.19 kg/m²     Body mass index is 32.19 kg/m².       Tongue: Large       Dentition: good       Pharynx: Posterior pharyngeal pillars are wide   Mallampatti: II (hard and soft palate, upper portion of tonsils anduvula visible)        General: Alert. Cooperative. Well developed. No acute distress.             Head:  Normocephalic. Symmetrical. Atraumatic.              Eyes: Sclera clear. No icterus. PERRLA. Normal EOM.             Ears: No deformities. Normal hearing.             Nose: No septal deviation. No drainage.          Throat: No oral lesions. No thrush. Moist mucous membranes.    Chest " Wall:  Normal shape. Symmetric expansion with respiration. No tenderness.             Neck:  Trachea midline.           Lungs:  Clear to auscultation bilaterally. No wheezes. No rhonchi. No rales. Respirations regular, even and unlabored.            Heart:  Regular rhythm and normal rate. Normal S1 and S2. No murmur.     Abdomen:  Soft, non-tender and non-distended. Normal bowel sounds. No masses.  Extremities:  Moves all extremities well. No edema.           Pulses: Pulses palpable and equal bilaterally.               Skin: Dry. Intact. No bleeding. No rash.           Neuro: Moves all 4 extremities and cranial nerves grossly intact.  Psychiatric: Normal mood and affect.      [facility]:  [facility] showed AHI of 10.3  [facility] showed CPAP of 11 cm insufficient    Report:  98% compliance average 7 hours AHI 0.7 set pressure 9 cm      Impression:  1. Obstructive sleep apnea, adult    2. Obesity (BMI 30-39.9)    3. Persistent hypersomnolence disorder    4. Dry mouth          Orders Placed This Encounter   Procedures    Pulmonary Results Scan            Plan:  Tre is compliant and needs a new script. Change to auto 5-10. [facility]    Having some dry mouth. Will try somnoseal.    If no improvement then titration study for hypersomnolence. RC on CPAP for now.    I reiterated the importance of effective treatment of obstructive sleep apnea with PAP machine.  Cardiovascular health risks of untreated sleep apnea were again reviewed.  Patient was asked to remain cautious if there is persistent hypersomnolence.    Change of PAP supplies regularly is important for effective use.  Change of cushion on the mask or plugs on nasal pillows along with disposable filters once every month and change of mask frame, tubing, headgear and Velcro straps every 6 months at the minimum was reiterated.    Weight reduction to achieve an ideal BMI will  decrease the severity of obstructive sleep apnea.  Portion limitation and regular exercise was emphasized.      Patient will follow up in this clinic 3 months    Thank you for allowing me to participate in your patient's care.    Electronically signed by Mathieu Solares MD, 07/12/24, 8:41 AM EDT.    Part of this note may be an electronic transcription/translation of spoken language to printed text using the Dragon Dictation System.

## 2024-10-02 ENCOUNTER — PATIENT MESSAGE (OUTPATIENT)
Dept: INTERNAL MEDICINE | Facility: CLINIC | Age: 59
End: 2024-10-02
Payer: COMMERCIAL

## 2024-10-11 ENCOUNTER — OFFICE VISIT (OUTPATIENT)
Dept: INTERNAL MEDICINE | Facility: CLINIC | Age: 59
End: 2024-10-11
Payer: COMMERCIAL

## 2024-10-11 VITALS
HEIGHT: 69 IN | HEART RATE: 71 BPM | BODY MASS INDEX: 30.78 KG/M2 | WEIGHT: 207.8 LBS | DIASTOLIC BLOOD PRESSURE: 88 MMHG | OXYGEN SATURATION: 95 % | TEMPERATURE: 98.3 F | SYSTOLIC BLOOD PRESSURE: 126 MMHG

## 2024-10-11 DIAGNOSIS — R42 DIZZINESS: Primary | ICD-10-CM

## 2024-10-11 DIAGNOSIS — J30.9 ALLERGIC RHINITIS, UNSPECIFIED SEASONALITY, UNSPECIFIED TRIGGER: ICD-10-CM

## 2024-10-11 PROCEDURE — 99213 OFFICE O/P EST LOW 20 MIN: CPT | Performed by: NURSE PRACTITIONER

## 2024-10-11 RX ORDER — LEVOTHYROXINE SODIUM 50 UG/1
50 TABLET ORAL DAILY
COMMUNITY
Start: 2024-07-10

## 2024-10-18 ENCOUNTER — OFFICE VISIT (OUTPATIENT)
Dept: SLEEP MEDICINE | Facility: HOSPITAL | Age: 59
End: 2024-10-18
Payer: COMMERCIAL

## 2024-10-18 ENCOUNTER — TELEPHONE (OUTPATIENT)
Dept: SLEEP MEDICINE | Facility: HOSPITAL | Age: 59
End: 2024-10-18
Payer: COMMERCIAL

## 2024-10-18 VITALS
BODY MASS INDEX: 31.7 KG/M2 | SYSTOLIC BLOOD PRESSURE: 135 MMHG | OXYGEN SATURATION: 97 % | WEIGHT: 214 LBS | HEART RATE: 60 BPM | HEIGHT: 69 IN | DIASTOLIC BLOOD PRESSURE: 84 MMHG

## 2024-10-18 DIAGNOSIS — G47.33 OBSTRUCTIVE SLEEP APNEA, ADULT: Primary | ICD-10-CM

## 2024-10-18 DIAGNOSIS — R68.2 DRY MOUTH: ICD-10-CM

## 2024-10-18 DIAGNOSIS — E66.9 OBESITY (BMI 30-39.9): ICD-10-CM

## 2024-10-18 PROCEDURE — G0463 HOSPITAL OUTPT CLINIC VISIT: HCPCS

## 2024-10-18 NOTE — PROGRESS NOTES
[facility]  Telephone: [phone] / Fax: [phone] [place]  Telephone: [phone] / Fax: [phone] Gemma Ariza    PCP: Pati Bashir APRN    Reason for visit: HARPRE[place] f/u    Tre Kenney is a 59 y.o.male  was last seen at [facility] sleep lab in July 2024. He changed mask style instead of getting somnoseal. Currently has under the nose and over the mouth mask size, medium cushion that controls dry mouth. He seems to be sleeping better. At times the air is leaking at the edges of his nose into the eyes.  Overnight oximetry on CPAP done end of September 2024 shows no desaturations    SH- no tobacco, no alcohol, 3 caffeine per day, no energy drinks.    ROS- +nasal congestion, +post nasal drip, +cough, rest is negative.    DME [facility]    Current Medications:    Current Outpatient Medications:     Alpha-Lipoic Acid 100 MG capsule, Take 50 mg by mouth., Disp: , Rfl:     Bioflavonoid Products (VITAMIN C ER) 1000-100 MG tablet controlled-release, Daily., Disp: , Rfl:     Cholecalciferol (VITAMIN D3) 5000 UNITS capsule capsule, Take 1 capsule by mouth Daily., Disp: , Rfl:     clomiPHENE (CLOMID) 50 MG tablet, TAKE 1 TABLET BY MOUTH 3 TIMES PER WEEK, Disp: , Rfl:     levothyroxine (SYNTHROID, LEVOTHROID) 50 MCG tablet, Take 1 tablet by mouth Daily., Disp: , Rfl:     metFORMIN ER (GLUCOPHAGE-XR) 750 MG 24 hr tablet, Take 1 tablet by mouth Daily With Breakfast., Disp: , Rfl:     multivitamin with minerals tablet tablet, , Disp: , Rfl:     Niacin (NICOTINIC ACID ER PO), , Disp: , Rfl:     Omega-3 Fatty Acids (FISH OIL) 1200 MG capsule capsule, , Disp: , Rfl:     Rhodiola rosea (RHODIOLA PO), , Disp: , Rfl:     Saccharomyces boulardii (Probiotic) 250 MG capsule, , Disp: , Rfl:     Unable to find, Eder Cholhelene 600 mg red yeast rice+CoQ 10-3 in a.m., 3 in p.m., Disp: , Rfl:     Unable to find, Kyolic Reserve-aged garlic extract, 2 in am, 2 in pm, Disp: , Rfl:     Unable to find, 1 each 1 (One) Time.  "T-3 (LIOTHYRONINE) 25 MCG E4M CAPSULE  TAKE 2 CapsuleS BY MOUTH EVERY MORNING AND 1 in THE afternoon, Disp: , Rfl:    also entered in Sleep Questionnaire    Patient  has a past medical history of Allergic (1973), Colon polyp (Dec. 2017), Depression (Nov. 2023), Diabetes mellitus (Prediabetic), Headache, History of medical problems (2008), HL (hearing loss), Hyperlipidemia, Hypothyroidism, Obesity (most of life), and Visual impairment.    I have reviewed the Past Medical History, Past Surgical History, Social History and Family History.    Vital Signs /84   Pulse 60   Ht 175.3 cm (69\")   Wt 97.1 kg (214 lb)   SpO2 97%   BMI 31.60 kg/m²  Body mass index is 31.6 kg/m².    General Alert and oriented. No acute distress noted   Pharynx/Throat Class II  Mallampati airway, large tongue, no evidence of redundant lateral pharyngeal tissue. No oral lesions. No thrush. Moist mucous membranes.   Head Normocephalic. Symmetrical. Atraumatic.    Nose No septal deviation. No drainage   Chest Wall Normal shape. Symmetric expansion with respiration. No tenderness.   Neck Trachea midline, no thyromegaly or adenopathy    Lungs Clear to auscultation bilaterally. No wheezes. No rhonchi. No rales. Respirations regular, even and unlabored.   Heart Regular rhythm and normal rate. Normal S1 and S2. No murmur   Abdomen Soft, non-tender and non-distended. Normal bowel sounds. No masses.   Extremities Moves all extremities well. No edema   Psychiatric Normal mood and affect.     Testing:  Download 7/19/24-10/16/24 100% use with average nightly use of 6 hours and 39 minutes on auto CPAP 5-10cm H2O, avg pressure is 7.5cm H2O, AHI 1.9/hr, leak is 1 min and 15 seconds.    [facility]:  [facility] showed AHI of 10.3/hr     Impression:  1. Obstructive sleep apnea, adult    2. Obesity (BMI 30-39.9)    3. Dry mouth          Plan:  Reduce CPAP to 5-9 cm H2O due to occasional leaks. Overnight oximetry on CPAP was " reviewed today with patient and does not show significant desaturation. We will therefore hold off on doing additional sleep studies at this time.  Dry mouth improved.    I reviewed download report and original sleep study report with the patient. I advised pt to contact us if PAP pressures feel excessive or insufficient.    Weight loss will be strongly beneficial to reduce the severity of sleep-disordered breathing.      Follow up with Dr. Solares in 6 months.    Thank you for allowing me to participate in your patient's care.      KARON Humphreys  [facility]  Phone: [phone]      Part of this note may be an electronic transcription/translation of spoken language to printed text using the Dragon Dictation System.

## 2024-10-23 ENCOUNTER — TELEPHONE (OUTPATIENT)
Dept: SLEEP MEDICINE | Facility: HOSPITAL | Age: 59
End: 2024-10-23
Payer: COMMERCIAL

## 2024-10-27 PROBLEM — R42 DIZZINESS: Status: ACTIVE | Noted: 2024-10-27

## 2024-10-27 PROBLEM — J30.9 ALLERGIC RHINITIS: Status: ACTIVE | Noted: 2024-10-27

## 2024-10-27 NOTE — PROGRESS NOTES
Chief Complaint  Dizziness (Dizziness episodes, vision changes episode. Symptoms don't happen at the same time ///70s at home) and Sore Throat  Subjective        Tre Kenney presents to [facility]  History of Present Illness  History of Present Illness  The patient presents for evaluation of dizziness.    He reports that the dizziness began about two weeks ago and was significant enough to prevent him from driving. The episodes have become less frequent over time. He found that bending his head forward while standing could trigger the dizziness, so he tried to keep his head straight. The dizziness would usually resolve after a few hours. He experienced a brief episode of dizziness this morning which has since resolved. He has had two episodes of dizziness in total.     He does not feel dizzy while sitting and has not noticed any issues with his ears. He also experiences dizziness during his cycle class when his pulse is elevated to about 150, but it resolves when he sits down. The first episode of dizziness lasted about five hours. He does not recall feeling nauseous during these episodes.    He reports experiencing a sore throat that began over a week ago, improved, but then recurred two days ago. He has not experienced any nasal or chest congestion. His wife has been ill with a cough, but her sore throat has resolved.    He has been monitoring his blood pressure at home, which has been in the 130s over upper 70s. This morning, during a dizzy spell, his blood pressure was slightly higher. He has not made any changes to his medication regimen recently, except for adding magnesium, which seems to help him sleep.    He experienced palpitations a couple of weeks ago, but they were not associated with the dizziness. About a month ago, he noticed visual disturbances in his left peripheral vision, which he thought might be an ocular migraine. The symptoms resolved within a few  "minutes.    He has been measuring his blood sugar in the mornings once a week, which has been consistently in the 110s with the lowest reading being 109. He has been in ketosis for most of the last 3.5 months, except for a break during his vacation.    He has been using an herbal antihistamine for drainage, as Benadryl did not provide relief in the past.     Objective   Vital Signs:  /88 (BP Location: Left arm, Patient Position: Sitting, Cuff Size: Adult)   Pulse 71   Temp 98.3 °F (36.8 °C) (Oral)   Ht 175.3 cm (69\")   Wt 94.3 kg (207 lb 12.8 oz)   SpO2 95%   BMI 30.69 kg/m²   Estimated body mass index is 30.69 kg/m² as calculated from the following:    Height as of this encounter: 175.3 cm (69\").    Weight as of this encounter: 94.3 kg (207 lb 12.8 oz).            Physical Exam  Constitutional:       Appearance: He is well-developed. He is not ill-appearing.   HENT:      Head: Normocephalic.      Right Ear: Hearing, tympanic membrane and external ear normal.      Left Ear: Hearing, tympanic membrane and external ear normal.      Nose: Nose normal. No nasal deformity, mucosal edema or rhinorrhea.      Right Sinus: No maxillary sinus tenderness or frontal sinus tenderness.      Left Sinus: No maxillary sinus tenderness or frontal sinus tenderness.      Mouth/Throat:      Dentition: Normal dentition.      Pharynx: Postnasal drip present.   Eyes:      General: Lids are normal.         Right eye: No discharge.         Left eye: No discharge.      Conjunctiva/sclera: Conjunctivae normal.      Right eye: No exudate.     Left eye: No exudate.  Neck:      Thyroid: No thyroid mass or thyromegaly.      Vascular: No carotid bruit.      Trachea: Trachea normal.   Cardiovascular:      Rate and Rhythm: Regular rhythm.      Pulses: Normal pulses.      Heart sounds: Normal heart sounds. No murmur heard.  Pulmonary:      Effort: No respiratory distress.      Breath sounds: Normal breath sounds. No decreased breath " sounds, wheezing, rhonchi or rales.   Abdominal:      General: Bowel sounds are normal.      Palpations: Abdomen is soft.      Tenderness: There is no abdominal tenderness.   Musculoskeletal:      Cervical back: Normal range of motion. No edema.   Lymphadenopathy:      Head:      Right side of head: No submental, submandibular, tonsillar, preauricular, posterior auricular or occipital adenopathy.      Left side of head: No submental, submandibular, tonsillar, preauricular, posterior auricular or occipital adenopathy.   Skin:     General: Skin is warm and dry.      Nails: There is no clubbing.   Neurological:      Mental Status: He is alert.   Psychiatric:         Behavior: Behavior is cooperative.        Physical Exam  Throat appears very red.  Heart sounds are normal.    Result Review :  The following data was reviewed by: KARON Jenkins on 10/11/2024:  Common labs          6/5/2024    09:27   Common Labs   PSA 0.8           Results               Assessment and Plan   Diagnoses and all orders for this visit:    1. Dizziness (Primary)  Assessment & Plan:  The dizziness could be attributed to postnasal drainage affecting the inner ear, leading to balance issues. The possibility of vertigo is also considered.  Over-the-counter Zyrtec or Xyzal is recommended for nighttime use. Mucinex, twice daily, is suggested to alleviate mucus production. Rest is advised for the upcoming weekend. Should the dizziness persist into next week, further evaluation will be necessary.      2. Allergic rhinitis, unspecified seasonality, unspecified trigger  Assessment & Plan:  His throat is very red, likely due to postnasal drainage. He is advised to continue taking an antihistamine. If symptoms persist, Mucinex is recommended to help with drainage.        Assessment & Plan           Follow Up   Return if symptoms worsen or fail to improve, for Next scheduled follow up.  Patient was given instructions and counseling regarding his  condition or for health maintenance advice. Please see specific information pulled into the AVS if appropriate.     Patient or patient representative verbalized consent for the use of Ambient Listening during the visit with  KARON Jenkins for chart documentation. 10/27/2024  09:58 EDT  Answers submitted by the patient for this visit:  Other (Submitted on 10/8/2024)  Please describe your symptoms.: About a month ago, I had what seemed to be an aural migraine: flashing, squiggly lines in my peripheral vision., A week and a half ago, I had dizzy spells lasting up to a few minutes for a few hours. A bit into it, I noticed that they could be triggered by my bending my head forward (e.g. looking down to wash my hands). They tapered off over time., , They both occurred on Saturday mornings when we were going somewhere., , The two incidents were  by a few weeks. No idea if they're related.  Have you had these symptoms before?: No  How long have you been having these symptoms?: 1-4 days  Please list any medications you are currently taking for this condition.: none  Please describe any probable cause for these symptoms. : Unknown.  Primary Reason for Visit (Submitted on 10/8/2024)  What is the primary reason for your visit?: Problem Not Listed

## 2024-10-27 NOTE — ASSESSMENT & PLAN NOTE
His throat is very red, likely due to postnasal drainage. He is advised to continue taking an antihistamine. If symptoms persist, Mucinex is recommended to help with drainage.

## 2025-04-22 ENCOUNTER — OFFICE VISIT (OUTPATIENT)
Dept: SLEEP MEDICINE | Facility: HOSPITAL | Age: 60
End: 2025-04-22
Payer: COMMERCIAL

## 2025-04-22 VITALS
OXYGEN SATURATION: 94 % | WEIGHT: 211 LBS | SYSTOLIC BLOOD PRESSURE: 143 MMHG | DIASTOLIC BLOOD PRESSURE: 75 MMHG | HEIGHT: 69 IN | BODY MASS INDEX: 31.25 KG/M2 | HEART RATE: 69 BPM

## 2025-04-22 DIAGNOSIS — E66.9 OBESITY (BMI 30-39.9): ICD-10-CM

## 2025-04-22 DIAGNOSIS — G47.33 OBSTRUCTIVE SLEEP APNEA, ADULT: Primary | ICD-10-CM

## 2025-04-22 PROCEDURE — G0463 HOSPITAL OUTPT CLINIC VISIT: HCPCS

## 2025-04-22 NOTE — PROGRESS NOTES
[facility]  [address]  [address]  [place] [address] [phone]    PCP: Pati Bashir APRN    Reason for visit:  Sleep disorders: HARPREET    Tre is a 60 y.o.male who was seen in the Sleep Disorders Center today. Regular fu. He uses nasal pillows and fits well. Sleeps from 10pm to 7am. He tried somnoseal for dry mouth and works well. He is sleeping more supine position.  Blanchard Sleepiness Scale is 5. Caffeine 2 per day. Alcohol <1 per week.    Tre  reports that he has never smoked. He has never used smokeless tobacco.    Pertinent Positive Review of Systems of denies  Rest of Review of Systems was negative as recorded in Sleep Questionnaire.    Patient  has a past medical history of Allergic (1973), Colon polyp (Dec. 2017), Depression (Nov. 2023), Diabetes mellitus (Prediabetic), Headache, History of medical problems (2008), HL (hearing loss), Hyperlipidemia, Hypothyroidism, Obesity (most of life), and Visual impairment.     Current Medications:    Current Outpatient Medications:     Alpha-Lipoic Acid 100 MG capsule, Take 50 mg by mouth., Disp: , Rfl:     Bioflavonoid Products (VITAMIN C ER) 1000-100 MG tablet controlled-release, Daily., Disp: , Rfl:     Cholecalciferol (VITAMIN D3) 5000 UNITS capsule capsule, Take 1 capsule by mouth Daily., Disp: , Rfl:     clomiPHENE (CLOMID) 50 MG tablet, TAKE 1 TABLET BY MOUTH 3 TIMES PER WEEK, Disp: , Rfl:     levothyroxine (SYNTHROID, LEVOTHROID) 50 MCG tablet, Take 1 tablet by mouth Daily., Disp: , Rfl:     metFORMIN ER (GLUCOPHAGE-XR) 750 MG 24 hr tablet, Take 1 tablet by mouth Daily With Breakfast., Disp: , Rfl:     multivitamin with minerals tablet tablet, , Disp: , Rfl:     Niacin (NICOTINIC ACID ER PO), , Disp: , Rfl:     Omega-3 Fatty Acids (FISH OIL) 1200 MG capsule capsule, , Disp: , Rfl:     Rhodiola rosea (RHODIOLA PO), , Disp: , Rfl:     Saccharomyces boulardii (Probiotic) 250 MG capsule, , Disp: , Rfl:     Unable  "to find, Eder Choleast 600 mg red yeast rice+CoQ 10-3 in a.m., 3 in p.m., Disp: , Rfl:     Unable to find, Kyolic Reserve-aged garlic extract, 2 in am, 2 in pm, Disp: , Rfl:     Unable to find, 1 each 1 (One) Time. T-3 (LIOTHYRONINE) 25 MCG E4M CAPSULE  TAKE 2 CapsuleS BY MOUTH EVERY MORNING AND 1 in THE afternoon, Disp: , Rfl:    also entered in Sleep Questionnaire         Vital Signs: /75   Pulse 69   Ht 175.3 cm (69\")   Wt 95.7 kg (211 lb)   SpO2 94%   BMI 31.16 kg/m²     Body mass index is 31.16 kg/m².       Tongue: Large       Dentition: good       Pharynx: Posterior pharyngeal pillars are wide   Mallampatti: II (hard and soft palate, upper portion of tonsils anduvula visible)        General: Alert. Cooperative. Well developed. No acute distress.             Nose: No septal deviation. No drainage.          Throat: No oral lesions. No thrush. Moist mucous membranes.           Lungs:  Clear to auscultation bilaterally.            Heart:  Regular rhythm and normal rate. No murmur.     Diagnostic data available to date is as below and was reviewed on current visit:  [facility] 12/7/2007 showed AHI of 10.3  [facility] 12/17/2007 showed CPAP of 11 cm sufficient    No results found for: \"IRON\", \"TIBC\", \"FERRITIN\"    Most current available usage data reviewed on 04/22/2025:  No scans are attached to the encounter or orders placed in the encounter.  100% compliance average 6-1/2 hours AHI 2.1 average pressure 6 to 7 cm Auto CPAP 5-9    DME Company: currently [employer] will switch to [employer]    Prescription to DME for replacement supplies as below:    nasal pillow    Replace head-gear every 3 months.  Replace cushions every 2-4 weeks.     A 4604 Heated Tubing  every 3 mth    A 7037 Standard Tubing  every 3 mth   x A 7035 Headgear  every 3 mth   x A 7046 Repl Humidifier Chamber  every 6 yrs   x A 7038 Disposable Filters  2 per mth   x A 7039 Non-disposable Filter  every 6 " mth   x A 7036 Chin Strap  every 6 mth     No orders of the defined types were placed in this encounter.         Impression:  1. Obstructive sleep apnea, adult    2. Obesity (BMI 30-39.9)        Plan:  Tre is compliant and doing well with CPAP machine.  His average pressure is low.  Sleep apnea is controlled.  He is having a lot of difficulty getting supplies from the [employer].  He will switch to [email or web address] as he has better success with them in the past.  He will continue annual follow-up here.    I reiterated the importance of effective treatment of obstructive sleep apnea with PAP machine.  Cardiovascular health risks of untreated sleep apnea were again reviewed.  Patient was asked to remain cautious if there is persistent hypersomnolence. The benefit of weight loss in reducing severity of obstructive sleep apnea was discussed.  Patient would benefit from adhering to a strict diet to achieve ideal BMI.     Change of PAP supplies regularly is important for effective use.  Change of cushion on the mask or plugs on nasal pillows along with disposable filters once every month and change of mask frame, tubing, headgear and Velcro straps every 6 months at the minimum was reiterated.    This patient is compliant with PAP machine and benefits from its use.  Apnea hypopneas index is corrected/improved.  Daytime hypersomnolence has resolved.     Patient will follow up in this clinic in 1 year APRN    Thank you for allowing me to participate in your patient's care.    Electronically signed by Mathieu Solares MD, 04/22/25, 3:33 PM EDT.    Part of this note may be an electronic transcription/translation of spoken language to printed text using the Dragon Dictation System.

## 2025-06-06 ENCOUNTER — OFFICE VISIT (OUTPATIENT)
Dept: INTERNAL MEDICINE | Facility: CLINIC | Age: 60
End: 2025-06-06
Payer: COMMERCIAL

## 2025-06-06 VITALS
OXYGEN SATURATION: 98 % | TEMPERATURE: 97.8 F | DIASTOLIC BLOOD PRESSURE: 84 MMHG | HEART RATE: 66 BPM | HEIGHT: 69 IN | WEIGHT: 207.8 LBS | BODY MASS INDEX: 30.78 KG/M2 | SYSTOLIC BLOOD PRESSURE: 120 MMHG

## 2025-06-06 DIAGNOSIS — E03.9 ADULT HYPOTHYROIDISM: Chronic | ICD-10-CM

## 2025-06-06 DIAGNOSIS — Z00.00 PHYSICAL EXAM: Primary | ICD-10-CM

## 2025-06-06 DIAGNOSIS — R73.09 ELEVATED GLUCOSE: ICD-10-CM

## 2025-06-06 DIAGNOSIS — Z12.5 SCREENING FOR PROSTATE CANCER: ICD-10-CM

## 2025-06-06 DIAGNOSIS — E78.00 HYPERCHOLESTEREMIA: Chronic | ICD-10-CM

## 2025-06-06 LAB
ALBUMIN SERPL-MCNC: 4.7 G/DL (ref 3.5–5.2)
ALBUMIN/GLOB SERPL: 2 G/DL
ALP SERPL-CCNC: 59 U/L (ref 39–117)
ALT SERPL-CCNC: 14 U/L (ref 1–41)
AST SERPL-CCNC: 17 U/L (ref 1–40)
BILIRUB SERPL-MCNC: 0.3 MG/DL (ref 0–1.2)
BUN SERPL-MCNC: 14 MG/DL (ref 6–20)
BUN/CREAT SERPL: 16.5 (ref 7–25)
CALCIUM SERPL-MCNC: 9.8 MG/DL (ref 8.6–10.5)
CHLORIDE SERPL-SCNC: 104 MMOL/L (ref 98–107)
CHOLEST SERPL-MCNC: 147 MG/DL (ref 0–200)
CO2 SERPL-SCNC: 27.5 MMOL/L (ref 22–29)
CREAT SERPL-MCNC: 0.85 MG/DL (ref 0.76–1.27)
EGFRCR SERPLBLD CKD-EPI 2021: 100.7 ML/MIN/1.73
ERYTHROCYTE [DISTWIDTH] IN BLOOD BY AUTOMATED COUNT: 13.1 % (ref 12.3–15.4)
GLOBULIN SER CALC-MCNC: 2.3 GM/DL
GLUCOSE SERPL-MCNC: 99 MG/DL (ref 65–99)
HBA1C MFR BLD: 5.2 % (ref 4.8–5.6)
HCT VFR BLD AUTO: 44.6 % (ref 37.5–51)
HDLC SERPL-MCNC: 33 MG/DL (ref 40–60)
HGB BLD-MCNC: 14.3 G/DL (ref 13–17.7)
LDLC SERPL CALC-MCNC: 94 MG/DL (ref 0–100)
MCH RBC QN AUTO: 28.5 PG (ref 26.6–33)
MCHC RBC AUTO-ENTMCNC: 32.1 G/DL (ref 31.5–35.7)
MCV RBC AUTO: 89 FL (ref 79–97)
PLATELET # BLD AUTO: 244 10*3/MM3 (ref 140–450)
POTASSIUM SERPL-SCNC: 4.8 MMOL/L (ref 3.5–5.2)
PROT SERPL-MCNC: 7 G/DL (ref 6–8.5)
PSA SERPL-MCNC: 0.83 NG/ML (ref 0–4)
RBC # BLD AUTO: 5.01 10*6/MM3 (ref 4.14–5.8)
SODIUM SERPL-SCNC: 141 MMOL/L (ref 136–145)
TRIGL SERPL-MCNC: 107 MG/DL (ref 0–150)
TSH SERPL DL<=0.005 MIU/L-ACNC: 1.48 UIU/ML (ref 0.27–4.2)
VLDLC SERPL CALC-MCNC: 20 MG/DL (ref 5–40)
WBC # BLD AUTO: 5.15 10*3/MM3 (ref 3.4–10.8)

## 2025-06-06 PROCEDURE — 99396 PREV VISIT EST AGE 40-64: CPT | Performed by: NURSE PRACTITIONER

## 2025-06-06 NOTE — PROGRESS NOTES
Subjective   Tre Kenney is a 60 y.o. male who is here for a physical exam.    History of Present Illness   History of Present Illness    He has been participating in the [employer] program over the past year, adheres to a ketogenic diet under a 's guidance resulting in weight loss.     He experiences occasional palpitations at rest, not during exertion but overall these have decreased since his last visit.     He continues to have floaters which he attributes to ocular migraines, has increased intake of magnesium which he feels has been helpful. No changes in vision; eye exam a month ago. He has also noted intermittent leg cramps. Prescribed electrolyte drink for exercise and Epsom salt cream for magnesium.     No recent dizziness or lightheadedness; last occurrence 6 months ago.     Intermittent mild allergies, no medication needed. No heartburn, or indigestion. Regular bowel movements. No issues with knees or hips.     Difficulty sleeping, improved recently; magnesium aids sleep continuity. Sleeping predominantly on left side, causing left shoulder discomfort.     Under care of Dr. Leon, dermatologist, with regular check-ups.     Contracted COVID-19 after Thanksgiving, recovered in 3 days.      Past Medical History:   Diagnosis Date    Allergic 1973    Sulfa drugs    Colon polyp Dec. 2017    May have been some in previous colonoscopies    Depression Nov. 2023    Actively working w/therapist    Diabetes mellitus Prediabetic    Headache     Occasional    History of medical problems 2008    Obstructive Sleep Apnea    HL (hearing loss)     Tinnitus    Hyperlipidemia     Hypothyroidism     Obesity most of life    Visual impairment     Age-related. See well at arm's length, not so well up close or far away.         Current Outpatient Medications:     Alpha-Lipoic Acid 100 MG capsule, Take 50 mg by mouth., Disp: , Rfl:     Berberine Chloride (BERBERINE HCI PO), 450 mg., Disp: , Rfl:     Bioflavonoid Products (VITAMIN  C ER) 1000-100 MG tablet controlled-release, Daily., Disp: , Rfl:     Cholecalciferol (VITAMIN D3) 5000 UNITS capsule capsule, Take 1 capsule by mouth Daily., Disp: , Rfl:     clomiPHENE (CLOMID) 50 MG tablet, TAKE 1 TABLET BY MOUTH 3 TIMES PER WEEK, Disp: , Rfl:     levothyroxine (SYNTHROID, LEVOTHROID) 50 MCG tablet, Take 1 tablet by mouth Daily., Disp: , Rfl:     MAGNESIUM PO, Every Night., Disp: , Rfl:     metFORMIN ER (GLUCOPHAGE-XR) 750 MG 24 hr tablet, Take 1 tablet by mouth Daily With Breakfast., Disp: , Rfl:     Minoxidil 5 % foam, , Disp: , Rfl:     multivitamin with minerals tablet tablet, , Disp: , Rfl:     Omega-3 Fatty Acids (FISH OIL) 1200 MG capsule capsule, , Disp: , Rfl:     RESVERATROL-GRAPE PO, , Disp: , Rfl:     Unable to find, Deer Choleast 600 mg red yeast rice+CoQ 10-3 in a.m., 3 in p.m., Disp: , Rfl:     Unable to find, Kyolic Reserve-aged garlic extract, 2 in am, 2 in pm, Disp: , Rfl:     Unable to find, 1 each 1 (One) Time. T-3 (LIOTHYRONINE) 25 MCG E4M CAPSULE  TAKE 2 CapsuleS BY MOUTH EVERY MORNING AND 1 in THE afternoon, Disp: , Rfl:     Allergies   Allergen Reactions    Sulfa Antibiotics Rash       Review of Systems   Constitutional:  Negative for activity change, appetite change, chills, diaphoresis, fatigue, fever and unexpected weight change.   HENT:  Positive for congestion and postnasal drip. Negative for dental problem, drooling, ear discharge, ear pain, facial swelling, hearing loss, mouth sores, nosebleeds, rhinorrhea, sinus pressure, sore throat, tinnitus and trouble swallowing.    Eyes:  Negative for photophobia, pain, discharge, redness, itching and visual disturbance.   Respiratory:  Negative for apnea, cough, choking, chest tightness, shortness of breath and wheezing.    Cardiovascular:  Negative for chest pain, palpitations and leg swelling.        No orthopnea, PND, RANKIN   Gastrointestinal:  Negative for abdominal pain, blood in stool, constipation, diarrhea, nausea and  "vomiting.   Endocrine: Negative for cold intolerance, heat intolerance, polydipsia and polyuria.   Genitourinary:  Negative for decreased urine volume, dysuria, enuresis, flank pain, frequency, hematuria and urgency.   Musculoskeletal:  Negative for arthralgias, back pain, gait problem, joint swelling, myalgias, neck pain and neck stiffness.        +leg cramps   Skin:  Negative for color change and rash.        No hair changes, no nail changes   Allergic/Immunologic: Positive for environmental allergies. Negative for food allergies and immunocompromised state.   Neurological:  Negative for dizziness, tremors, seizures, syncope, speech difficulty, weakness, light-headedness, numbness and headaches.   Hematological:  Negative for adenopathy. Does not bruise/bleed easily.   Psychiatric/Behavioral:  Positive for sleep disturbance. Negative for agitation, confusion, decreased concentration, dysphoric mood and suicidal ideas. The patient is not nervous/anxious.        Objective   Vitals:    06/06/25 0751   BP: 120/84   BP Location: Left arm   Patient Position: Sitting   Cuff Size: Adult   Pulse: 66   Temp: 97.8 °F (36.6 °C)   SpO2: 98%   Weight: 94.3 kg (207 lb 12.8 oz)   Height: 175.3 cm (69\")     Physical Exam  Constitutional:       General: He is not in acute distress.     Appearance: Normal appearance. He is not diaphoretic.   HENT:      Head: Normocephalic and atraumatic.      Right Ear: Tympanic membrane, ear canal and external ear normal.      Left Ear: Tympanic membrane, ear canal and external ear normal.      Nose: Nose normal. No rhinorrhea.      Mouth/Throat:      Mouth: Mucous membranes are moist.      Pharynx: Oropharynx is clear.   Eyes:      General:         Right eye: No discharge.         Left eye: No discharge.      Conjunctiva/sclera: Conjunctivae normal.   Cardiovascular:      Rate and Rhythm: Normal rate and regular rhythm.      Pulses: Normal pulses.      Heart sounds: Normal heart sounds. "   Pulmonary:      Effort: Pulmonary effort is normal.      Breath sounds: Normal breath sounds.   Abdominal:      General: Bowel sounds are normal.      Tenderness: There is no abdominal tenderness.   Musculoskeletal:         General: No swelling or tenderness.      Cervical back: Normal range of motion.   Skin:     General: Skin is warm and dry.   Neurological:      General: No focal deficit present.      Mental Status: He is alert and oriented to person, place, and time.   Psychiatric:         Mood and Affect: Mood normal.         Behavior: Behavior normal.         Judgment: Judgment normal.           Results  Laboratory Studies  Total cholesterol: 165. LDL: 98. Hemoglobin A1c: 5.5. Blood sugar: 100. Magnesium: 6.7.       Assessment & Plan   Diagnoses and all orders for this visit:    1. Physical exam (Primary)  -     CBC (No Diff)  -     Comprehensive Metabolic Panel  -     Lipid Panel  -     TSH    2. Adult hypothyroidism  Assessment & Plan:  He is currently managed on Synthroid 50 mcg daily for replacement, recheck TSH.      3. Hypercholesteremia  Assessment & Plan:  Small LDL particles elevated with previous labs, working on lifestyle changes. Recheck lipid panel today.      4. Elevated glucose  Comments:  A1c improved to 5.5 with recent labs, recheck today.  Orders:  -     Hemoglobin A1c    5. Screening for prostate cancer  -     PSA Screen      Assessment & Plan  Health maintenance.  Blood pressure: 120/84, improved from previous visits.     Labs through integrative physician: Hemoglobin A1c improved, likely due to [facility] program initiation in June 2024. Slight improvement in LDL particle size. C-reactive protein levels normal. Magnesium: 6.7 a year ago.    Consider shingles vaccine series.       Risk Assessment:  Family History   Problem Relation Age of Onset    Hypothyroidism Mother     Hypertension Mother     Emphysema Mother     Arthritis Mother     COPD Mother     Hearing loss Mother         wears  hearing aids    Heart disease Mother         Heart attack    Thyroid disease Mother         Hypothyroid    Miscarriages / Stillbirths Mother     Cancer Mother         Colon, lung, skin    Vision loss Mother         Due to treatment for lung cancer    Lung cancer Father     Colon cancer Father     Hypertension Father     Emphysema Father     Alcohol abuse Father     Cancer Father         Colon, lung, skin    Vision loss Father         Due to treatment for lung cancer    Prostate cancer Brother     ADD / ADHD Son     Asperger's syndrome Son     Anxiety disorder Son     Depression Son     Cancer Maternal Grandmother     Cancer Paternal Grandmother     Anxiety disorder Son     Depression Son     Developmental Disability Son         Asperger's    Drug abuse Brother         Half brother    Diabetes Paternal Aunt     Drug abuse Brother         Half brother    Anxiety disorder Son     Depression Son     Developmental Disability Son         Asperger's     His Body mass index is 30.69 kg/m². He has made lifestyle changes through the [employer] program and has lost weight over the past year-encouraged to continue program which has been beneficial.    Prevention:  Health Maintenance   Topic Date Due    Pneumococcal Vaccine 50+ (1 of 1 - PCV) Never done    ZOSTER VACCINE (1 of 2) Never done    ANNUAL PHYSICAL  05/31/2025    COVID-19 Vaccine (6 - 2024-25 season) 09/01/2025 (Originally 9/1/2024)    INFLUENZA VACCINE  07/01/2025    LIPID PANEL  06/06/2026    TDAP/TD VACCINES (3 - Tdap) 01/24/2027    COLORECTAL CANCER SCREENING  01/25/2028    HEPATITIS C SCREENING  Completed       Discussed healthy lifestyle choices such as maintaining a balanced diet low in carbohydrates and limiting caffeine and alcohol intake.  Recommended routine exercise for bone strength and cardiovascular health.       Patient or patient representative verbalized consent for the use of Ambient Listening during the visit with  KARON Jenkins for chart  documentation. 6/8/2025  08:31 EDT

## 2025-06-08 PROBLEM — R03.0 ELEVATED BLOOD PRESSURE READING: Status: RESOLVED | Noted: 2022-05-25 | Resolved: 2025-06-08

## 2025-06-08 NOTE — ASSESSMENT & PLAN NOTE
Small LDL particles elevated with previous labs, working on lifestyle changes. Recheck lipid panel today.